# Patient Record
Sex: MALE | Race: BLACK OR AFRICAN AMERICAN
[De-identification: names, ages, dates, MRNs, and addresses within clinical notes are randomized per-mention and may not be internally consistent; named-entity substitution may affect disease eponyms.]

---

## 2020-03-14 ENCOUNTER — HOSPITAL ENCOUNTER (EMERGENCY)
Dept: HOSPITAL 97 - ER | Age: 35
LOS: 1 days | Discharge: HOME | End: 2020-03-15
Payer: SELF-PAY

## 2020-03-14 DIAGNOSIS — T22.111A: ICD-10-CM
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
## 2020-03-14 DIAGNOSIS — F17.210: ICD-10-CM

## 2020-03-14 DIAGNOSIS — R07.9: Primary | ICD-10-CM

## 2020-03-14 PROCEDURE — 99283 EMERGENCY DEPT VISIT LOW MDM: CPT

## 2020-03-14 PROCEDURE — 71046 X-RAY EXAM CHEST 2 VIEWS: CPT

## 2020-03-15 VITALS — TEMPERATURE: 98 F

## 2020-03-15 VITALS — SYSTOLIC BLOOD PRESSURE: 160 MMHG | OXYGEN SATURATION: 99 % | DIASTOLIC BLOOD PRESSURE: 85 MMHG
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
## 2024-12-17 ENCOUNTER — HOSPITAL ENCOUNTER (INPATIENT)
Dept: HOSPITAL 97 - ER | Age: 39
LOS: 11 days | Discharge: HOME | DRG: 871 | End: 2024-12-28
Attending: HOSPITALIST | Admitting: STUDENT IN AN ORGANIZED HEALTH CARE EDUCATION/TRAINING PROGRAM
Payer: COMMERCIAL

## 2024-12-17 VITALS — BODY MASS INDEX: 38.3 KG/M2

## 2024-12-17 DIAGNOSIS — M62.82: ICD-10-CM

## 2024-12-17 DIAGNOSIS — Z78.1: ICD-10-CM

## 2024-12-17 DIAGNOSIS — N18.9: ICD-10-CM

## 2024-12-17 DIAGNOSIS — J10.08: ICD-10-CM

## 2024-12-17 DIAGNOSIS — R31.29: ICD-10-CM

## 2024-12-17 DIAGNOSIS — E83.39: ICD-10-CM

## 2024-12-17 DIAGNOSIS — Z91.148: ICD-10-CM

## 2024-12-17 DIAGNOSIS — K76.0: ICD-10-CM

## 2024-12-17 DIAGNOSIS — E87.1: ICD-10-CM

## 2024-12-17 DIAGNOSIS — I50.9: ICD-10-CM

## 2024-12-17 DIAGNOSIS — E11.22: ICD-10-CM

## 2024-12-17 DIAGNOSIS — J15.9: ICD-10-CM

## 2024-12-17 DIAGNOSIS — R65.20: ICD-10-CM

## 2024-12-17 DIAGNOSIS — G92.8: ICD-10-CM

## 2024-12-17 DIAGNOSIS — Z74.01: ICD-10-CM

## 2024-12-17 DIAGNOSIS — I13.0: ICD-10-CM

## 2024-12-17 DIAGNOSIS — R74.01: ICD-10-CM

## 2024-12-17 DIAGNOSIS — Z79.899: ICD-10-CM

## 2024-12-17 DIAGNOSIS — D63.1: ICD-10-CM

## 2024-12-17 DIAGNOSIS — E87.5: ICD-10-CM

## 2024-12-17 DIAGNOSIS — N17.9: ICD-10-CM

## 2024-12-17 DIAGNOSIS — A41.89: Primary | ICD-10-CM

## 2024-12-17 DIAGNOSIS — R79.89: ICD-10-CM

## 2024-12-17 LAB
ALBUMIN SERPL BCP-MCNC: 3.6 G/DL (ref 3.4–5)
ALBUMIN/GLOB SERPL: 0.8 {RATIO} (ref 1.1–1.8)
ALP SERPL-CCNC: 78 U/L (ref 45–117)
ALT SERPL W P-5'-P-CCNC: 152 U/L (ref 16–61)
ANION GAP SERPL CALC-SCNC: 10.9 MEQ/L (ref 5–15)
APTT BLD: 33.1 SECONDS (ref 24.3–36.9)
AST SERPL W P-5'-P-CCNC: 386 U/L (ref 15–37)
BUN BLD-MCNC: 11 MG/DL (ref 7–18)
COLOR SPUN FLD: (no result)
GLOBULIN SER CALC-MCNC: 4.6 G/DL (ref 2.3–3.5)
GLUCOSE SERPLBLD-MCNC: 179 MG/DL (ref 74–106)
HCT VFR BLD CALC: 42.7 % (ref 39.6–49)
HGB BLD-MCNC: 13.8 G/DL (ref 13.6–17.9)
INR BLD: 1.39
LYMPHOCYTES # SPEC AUTO: 3.3 K/UL (ref 0.7–4.9)
MCH RBC QN AUTO: 28.7 PG (ref 27–35)
MCHC RBC AUTO-ENTMCNC: 32.3 G/DL (ref 32–36)
MCV RBC: 88.8 FL (ref 80–100)
NRBC # BLD: 0 10*3/UL (ref 0–0)
NRBC BLD AUTO-RTO: 0.1 % (ref 0–0)
PMV BLD: 11.9 FL (ref 7.6–11.3)
POTASSIUM SERPL-SCNC: 3.9 MEQ/L (ref 3.5–5.1)
PROTHROMBIN TIME: 15.4 SECONDS (ref 9.4–12.5)
RBC # BLD: 4.81 M/UL (ref 4.33–5.43)
SARS-COV+SARS-COV-2 AG RESP QL IA.RAPID: (no result)
SQUAMOUS URNS QL MICRO: <5 /HPF
TOTAL CELLS COUNTED FLD: 100
UA COMPLETE W REFLEX CULTURE PNL UR: (no result)
UA DIPSTICK W REFLEX MICRO PNL UR: (no result)
WBC # BLD AUTO: 10.1 THOU/UL (ref 4.3–10.9)

## 2024-12-17 PROCEDURE — 96365 THER/PROPH/DIAG IV INF INIT: CPT

## 2024-12-17 PROCEDURE — 80048 BASIC METABOLIC PNL TOTAL CA: CPT

## 2024-12-17 PROCEDURE — 71045 X-RAY EXAM CHEST 1 VIEW: CPT

## 2024-12-17 PROCEDURE — 87070 CULTURE OTHR SPECIMN AEROBIC: CPT

## 2024-12-17 PROCEDURE — 84145 PROCALCITONIN (PCT): CPT

## 2024-12-17 PROCEDURE — 82306 VITAMIN D 25 HYDROXY: CPT

## 2024-12-17 PROCEDURE — 84156 ASSAY OF PROTEIN URINE: CPT

## 2024-12-17 PROCEDURE — 83605 ASSAY OF LACTIC ACID: CPT

## 2024-12-17 PROCEDURE — 80074 ACUTE HEPATITIS PANEL: CPT

## 2024-12-17 PROCEDURE — 84439 ASSAY OF FREE THYROXINE: CPT

## 2024-12-17 PROCEDURE — 84132 ASSAY OF SERUM POTASSIUM: CPT

## 2024-12-17 PROCEDURE — 85610 PROTHROMBIN TIME: CPT

## 2024-12-17 PROCEDURE — 82043 UR ALBUMIN QUANTITATIVE: CPT

## 2024-12-17 PROCEDURE — 36569 INSJ PICC 5 YR+ W/O IMAGING: CPT

## 2024-12-17 PROCEDURE — 82947 ASSAY GLUCOSE BLOOD QUANT: CPT

## 2024-12-17 PROCEDURE — 82077 ASSAY SPEC XCP UR&BREATH IA: CPT

## 2024-12-17 PROCEDURE — 87040 BLOOD CULTURE FOR BACTERIA: CPT

## 2024-12-17 PROCEDURE — 94760 N-INVAS EAR/PLS OXIMETRY 1: CPT

## 2024-12-17 PROCEDURE — 83735 ASSAY OF MAGNESIUM: CPT

## 2024-12-17 PROCEDURE — 80202 ASSAY OF VANCOMYCIN: CPT

## 2024-12-17 PROCEDURE — 80053 COMPREHEN METABOLIC PANEL: CPT

## 2024-12-17 PROCEDURE — 84100 ASSAY OF PHOSPHORUS: CPT

## 2024-12-17 PROCEDURE — 97161 PT EVAL LOW COMPLEX 20 MIN: CPT

## 2024-12-17 PROCEDURE — 96368 THER/DIAG CONCURRENT INF: CPT

## 2024-12-17 PROCEDURE — 87389 HIV-1 AG W/HIV-1&-2 AB AG IA: CPT

## 2024-12-17 PROCEDURE — 84443 ASSAY THYROID STIM HORMONE: CPT

## 2024-12-17 PROCEDURE — 74176 CT ABD & PELVIS W/O CONTRAST: CPT

## 2024-12-17 PROCEDURE — 96375 TX/PRO/DX INJ NEW DRUG ADDON: CPT

## 2024-12-17 PROCEDURE — 93005 ELECTROCARDIOGRAM TRACING: CPT

## 2024-12-17 PROCEDURE — 009U3ZX DRAINAGE OF SPINAL CANAL, PERCUTANEOUS APPROACH, DIAGNOSTIC: ICD-10-PCS

## 2024-12-17 PROCEDURE — 83930 ASSAY OF BLOOD OSMOLALITY: CPT

## 2024-12-17 PROCEDURE — 99292 CRITICAL CARE ADDL 30 MIN: CPT

## 2024-12-17 PROCEDURE — 84550 ASSAY OF BLOOD/URIC ACID: CPT

## 2024-12-17 PROCEDURE — 82085 ASSAY OF ALDOLASE: CPT

## 2024-12-17 PROCEDURE — 89050 BODY FLUID CELL COUNT: CPT

## 2024-12-17 PROCEDURE — 36415 COLL VENOUS BLD VENIPUNCTURE: CPT

## 2024-12-17 PROCEDURE — 87811 SARS-COV-2 COVID19 W/OPTIC: CPT

## 2024-12-17 PROCEDURE — 81001 URINALYSIS AUTO W/SCOPE: CPT

## 2024-12-17 PROCEDURE — 83874 ASSAY OF MYOGLOBIN: CPT

## 2024-12-17 PROCEDURE — 51702 INSERT TEMP BLADDER CATH: CPT

## 2024-12-17 PROCEDURE — 96361 HYDRATE IV INFUSION ADD-ON: CPT

## 2024-12-17 PROCEDURE — 85730 THROMBOPLASTIN TIME PARTIAL: CPT

## 2024-12-17 PROCEDURE — 99291 CRITICAL CARE FIRST HOUR: CPT

## 2024-12-17 PROCEDURE — 0T9B70Z DRAINAGE OF BLADDER WITH DRAINAGE DEVICE, VIA NATURAL OR ARTIFICIAL OPENING: ICD-10-PCS

## 2024-12-17 PROCEDURE — 62270 DX LMBR SPI PNXR: CPT

## 2024-12-17 PROCEDURE — 82550 ASSAY OF CK (CPK): CPT

## 2024-12-17 PROCEDURE — 97116 GAIT TRAINING THERAPY: CPT

## 2024-12-17 PROCEDURE — 86140 C-REACTIVE PROTEIN: CPT

## 2024-12-17 PROCEDURE — 85025 COMPLETE CBC W/AUTO DIFF WBC: CPT

## 2024-12-17 PROCEDURE — 82570 ASSAY OF URINE CREATININE: CPT

## 2024-12-17 PROCEDURE — 87804 INFLUENZA ASSAY W/OPTIC: CPT

## 2024-12-17 PROCEDURE — 97530 THERAPEUTIC ACTIVITIES: CPT

## 2024-12-17 PROCEDURE — 70450 CT HEAD/BRAIN W/O DYE: CPT

## 2024-12-17 PROCEDURE — 80069 RENAL FUNCTION PANEL: CPT

## 2024-12-17 PROCEDURE — 86038 ANTINUCLEAR ANTIBODIES: CPT

## 2024-12-17 PROCEDURE — 83935 ASSAY OF URINE OSMOLALITY: CPT

## 2024-12-17 PROCEDURE — 83615 LACTATE (LD) (LDH) ENZYME: CPT

## 2024-12-17 PROCEDURE — 94640 AIRWAY INHALATION TREATMENT: CPT

## 2024-12-17 RX ADMIN — OSELTAMIVIR PHOSPHATE SCH: 75 CAPSULE ORAL at 21:00

## 2024-12-17 RX ADMIN — ACETAMINOPHEN PRN MG: 650 SUPPOSITORY RECTAL at 23:44

## 2024-12-17 RX ADMIN — SODIUM CHLORIDE SCH MLS: 0.9 INJECTION, SOLUTION INTRAVENOUS at 20:37

## 2024-12-17 RX ADMIN — METRONIDAZOLE SCH MLS: 500 INJECTION, SOLUTION INTRAVENOUS at 20:59

## 2024-12-17 RX ADMIN — SODIUM CHLORIDE SCH MLS: 9 INJECTION, SOLUTION INTRAVENOUS at 21:10

## 2024-12-17 RX ADMIN — SODIUM CHLORIDE ONE MLS: 9 INJECTION, SOLUTION INTRAVENOUS at 21:43

## 2024-12-17 RX ADMIN — HEPARIN SODIUM SCH UNIT: 5000 INJECTION, SOLUTION INTRAVENOUS; SUBCUTANEOUS at 20:59

## 2024-12-17 RX ADMIN — HUMAN INSULIN SCH UNIT: 100 INJECTION, SOLUTION SUBCUTANEOUS at 20:58

## 2024-12-17 RX ADMIN — SODIUM CHLORIDE SCH: 9 INJECTION, SOLUTION INTRAVENOUS at 17:22

## 2024-12-17 NOTE — RAD REPORT
EXAM: CT brain without contrast



HISTORY: AMS



COMPARISON: 2/5/2016



TECHNIQUE: Multiple contiguous axial images were obtained and a CT of the brain without contrast. Sag
ittal and coronal reformats were performed.

 One or more of the following dose reduction techniques were used: Automated exposure control, adjust
ment of the mA and/or kV according to patient size, and/or iterative reconstruction. 



FINDINGS:

 

No evidence of hydrocephalus, intracranial hemorrhage, or extra-axial fluid collection.

 The brain is normal in morphology. No evidence of midline shift or areas of brain edema.



The calvarium is intact. The visualized paranasal sinuses and mastoid air cells are essentially clear
.



IMPRESSION: 



No evidence of acute intracranial abnormality. 







Reported By: Raman Morton 

Electronically Signed:  12/17/2024 11:49 AM

## 2024-12-17 NOTE — P.HP
Certification for Inpatient


Patient admitted to: Inpatient


With expected LOS: >2 Midnights


Patient will require the following post-hospital care: None


Practitioner: I am a practitioner with admitting privileges, knowledge of 

patient current condition, hospital course, and medical plan of care.


Services: Services provided to patient in accordance with Admission requirements

found in Title 42 Section 412.3 of the Code of Federal Regulations





Patient History


Date of Service: 12/18/24


Reason for admission: AMS, pneumonia


History of Present Illness: 





39-year-old male with history of diabetes, hypertension, CHF presents emergency 

department chief complaint of altered mental status.  His wife reports that over

the course of the last 24 to 36 hours he had been having large amounts of 

diarrhea, vomiting and been becoming confused.  He was incontinent of urine and 

stool at home and had been becoming progressively more altered throughout the 

day.  For that reason he is brought to the emergency department.  





When he arrived to the emergency department he was lethargic, confused, oriented

x 1-2.  His labs were significant for a white blood cell count of 10.1 platelets

113 sodium 135 creatinine 1.75 GFR 50 glucose 179 lactic acid 3.4  ALT 

152 T. bili 0.8.  CT of the abdomen pelvis was performed which revealed no acute

or concerning abnormalities in the abdomen or pelvis, does show fatty liver.  CT

of the head was negative for acute findings, chest x-ray showed mild 

interstitial prominence greatest in the left upper lobe likely related to 

infection.





Given patient's significant altered mental status and lack of clear explanation 

lumbar puncture was also performed, spinal fluid was clear and is pending 

interpretation.


Allergies





No Known Allergies Allergy (Unverified 12/17/24 21:55)


   





Home Medications: 








NK [No Home Meds]  12/17/24 








- Past Medical/Surgical History


-: CHF


-: Diabetes


-: Hypertension


Psychosocial/ Personal History: Patient lives at home with his wife, works at 

[employer]





- Family History


Family History: Reviewed- Non-Contributory





- Social History


Alcohol use: No


CD- Drugs: No


Caffeine use: Yes


Place of Residence: Home





Review of Systems


10-point ROS is otherwise unremarkable


Gastrointestinal: Nausea, Vomiting


Neurological: Confusion





Physical Examination





- Physical Exam


General: Confused (Drowsy)


HEENT: Atraumatic, PERRLA, Mucous membr. moist/pink, EOMI, Sclerae nonicteric


Neck: Supple, 2+ carotid pulse no bruit, No LAD, Without JVD or thyroid 

abnormality


Respiratory: Clear to auscultation bilaterally, Normal air movement


Cardiovascular: Regular rate/rhythm, Normal S1 S2


Gastrointestinal: Normal bowel sounds, No tenderness


Musculoskeletal: No tenderness


Integumentary: No rashes


Neurological: Normal gait, Normal speech, Normal strength at 5/5 x4 extr, Normal

tone, Normal affect


Lymphatics: No axilla or inguinal lymphadenopathy





- Studies


Laboratory Data (last 24 hrs)











  12/17/24 12/17/24





  10:16 10:16


 


WBC   10.10


 


Hgb   13.8


 


Hct   42.7


 


Plt Count   113 L


 


Sodium  135 L 


 


Potassium  3.9 


 


BUN  11 


 


Creatinine  1.75 H 


 


Glucose  179 H 


 


Total Bilirubin  0.8 


 


AST  386 H 


 


ALT  152 H 


 


Alkaline Phosphatase  78 











Assessment and Plan





- Plan


Assessment:


Severe sepsis secondary to influenza A/viral


Metabolic encephalopathy secondary to above


Acute kidney injury


Transaminitis


History of diabetes mellitus type 2noncompliant


History of CHFunknown EF


History of hypertension








Plan:


Severe sepsis secondary to influenza A/viral


Metabolic encephalopathy secondary to above


Positive for influenza A


Patient with profuse watery diarrhea


Confused/agitated


Will order Tamiflu but likely unable to take by mouth for now


Continue broad-spectrum antibiotics given concern for pneumonia, profuse 

diarrhea


Reevaluate antibiotics after cultures return


Pulmonary consult








Acute kidney injury


Continue IV fluids


Nephrology consult





Transaminitis


CT abdomen pelvis performed negative for acute findings, trend CMP





History of diabetes mellitus type 2noncompliant


History of CHFunknown EF


History of hypertension


Patient noncompliant with medication for at least the last few months per wife





DVT PPX: Heparin subcu 


Code status:full


Discharge Plan: Home


Plan to discharge in: Greater than 2 days





- Advance Directives


Does patient have a Living Will: No


Does patient have a Durable POA for Healthcare: No





- Code Status/Comfort Care


Code Status Assessed: Yes (Full code)


Critical Care: No


Time Spent Managing Pts Care (In Minutes): 62

## 2024-12-17 NOTE — EDPHYS
Physician Documentation                                                                           

 [facility]                                                                 

Name: Aniket May Jr                                                                              

Age: 39 yrs                                                                                       

Sex: Male                                                                                         

: 1985                                                                                   

MRN: [id number]                                                                                   

Arrival Date: 2024                                                                          

Time: 09:59                                                                                       

Account#: [id number]                                                                            

Bed 3                                                                                             

Private MD:                                                                                       

ED Physician Gilbert Chavez                                                                         

HPI:                                                                                              

                                                                                             

11:30 This 39 yrs old Black Male presents to ER via EMS with complaints of Altered Mental     rn  

      Status.                                                                                     

11:30 The patient presents with decreased responsiveness. Onset: The symptoms/episode         rn  

      began/occurred 2 day(s) ago. Possible causes: unknown. Current symptoms: In the             

      emergency department the patient's symptoms are unchanged from the initial                  

      presentation. It is unknown whether or not the patient has had similar symptoms in the      

      past. EMS reports called out by family member for altered mental status and decreased       

      responsiveness. Family member reported flulike symptoms and nausea and vomiting with        

      diarrhea for the last 24 hours with decreased responsiveness today. No seizure              

      activity. Normal glucose per EMS..                                                          

                                                                                                  

Historical:                                                                                       

- Allergies:                                                                                      

11:01 oranges;                                                                                kc6 

- Home Meds:                                                                                      

10:04 Unable to obtain [Active];                                                              kc6 

- PMHx:                                                                                           

10:04 Hypertensive disorder;                                                                  kc6 

11:01 Diabetes mellitus; Congestive heart failure;                                            kc6 

- PSHx:                                                                                           

10:04 Unable to Obtain;                                                                       kc6 

                                                                                                  

- Immunization history:: Adult Immunizations unknown.                                             

- Infectious Disease History:: Denies.                                                            

- Social history:: Smoking status: unknown.                                                       

- Unable to obtain history due to: altered mental status.                                         

                                                                                                  

                                                                                                  

ROS:                                                                                              

11:30 Unable to obtain ROS due to altered mental status,                                      rn  

                                                                                                  

Exam:                                                                                             

11:30 Constitutional:  This is a well developed, well nourished patient who is awake,         rn  

      noncooperative Head/Face:  Normocephalic, atraumatic. ENT:  Dry mucous membranes            

      Cardiovascular:  Tachycardic, regular. Respiratory:  Mild tachypnea Abdomen/GI:  Soft,      

      mid abdominal tenderness without distention or discoloration MS/ Extremity:  Pulses         

      equal, no cyanosis.  Neurovascular intact.  Full, normal range of motion.  Equal            

      circumference. Neuro:  Awake, opens eyes, answers some questions, seems selective,          

      moves all 4 extremities with equal strength                                                 

12:35 ECG was reviewed by the Attending Physician.                                            rn  

                                                                                                  

Vital Signs:                                                                                      

10:02  / 62; Pulse 115; Resp 18 S; Temp 100.1(O); Pulse Ox 96% on R/A; Weight 124.28 kg kc6 

      (M);                                                                                        

10:20 BP 90 / 58;                                                                             kc6 

11:00  / 65; Pulse 106; Resp 36 S; Pulse Ox 98% on R/A;                                 kc6 

11:47  / 73; Pulse 109; Resp 32; Temp 100.6(TE); Pulse Ox 93% on R/A;                   kc6 

12:05 BP 97 / 74; Pulse 100; Resp 21 S; Pulse Ox 91% on R/A;                                  kc6 

13:54  / 88; Pulse 110; Resp 30 S; Pulse Ox 96% on 3 lpm NC;                            kc6 

14:08  / 93; Pulse 112; Resp 28 S; Temp 100.5(TE); Pulse Ox 95% on 3 lpm NC;            kc6 

15:08  / 88; Pulse 110; Resp 30 S; Pulse Ox 96% on 3 lpm NC;                            kc6 

17:06  / 90; Pulse 109; Resp 25 S; Temp 102.8(R); Pulse Ox 94% on 3 lpm NC;             kc6 

17:34  / 82; Pulse 110; Resp 27 S; Pulse Ox 100% on 3 lpm NC;                           kc6 

19:43  / 76; Pulse 108; Resp 21; Temp 101.2; Pulse Ox 99% on 3 lpm NC;                  ha1 

19:50  / 51; Pulse 109; Resp 23; Pulse Ox 98% on 3 lpm NC;                              ay  

                                                                                                  

Procedures:                                                                                       

16:48 Lumbar Puncture: Patient placed in left lateral decubitus position. Prepped with        rn  

      Betadine. Draped using sterile technique. Collected 5 ml's of clear fluid. Sample sent      

      to lab. Patient tolerated well. Lumbar Puncture: Opening pressure was low, fluid check      

      10 to 15 minutes to collect.. Procedural sedation: Pre-procedure assessment: ASA            

      physical classification: I - healthy, no underlying organic disease, Airway assessment:     

      able to maintain airway, can open mouth without difficulty, Monitoring during               

      procedure: cardiac monitor, continuous pulse oximetry, nurse at bedside at all times,       

      Medications employed: Ativan, 0.5 mg(s), Ketamine, 100 mg(s), Post-procedure                

      assessment: Respiratory status: even and unlabored, a reversal agent was not used.          

                                                                                                  

MDM:                                                                                              

10:00 Medical Screening Exam initiated                                                        rn  

13:54 Differential Diagnosis: pneumonia, volume depletion, Colitis, enteritis. Data reviewed: rn  

      vital signs, nurses notes, lab test result(s), radiologic studies, CT scan, plain           

      films, and as a result, I will admit patient. Consideration of Admission/Observation        

      Patient was admitted/placed on observation. Escalation of care including                    

      admission/observation considered. Counseling: I had a detailed discussion with the          

      patient and/or guardian regarding the historical points, exam findings, and any             

      diagnostic results supporting the discharge/admit diagnosis, lab results, radiology         

      results, the need for further work-up and treatment in the hospital. Response to            

      treatment: the patient's symptoms have markedly improved after treatment, and as a          

      result, I will admit patient.                                                               

13:55 ED course: More alert, patient speaks to family in room, elevated lactate, possible     rn  

      pneumonia. .                                                                                

16:50 Management of patient was discussed with the following: Hospitalist: Discussed case     rn  

      with hospitalist, they request lumbar puncture to rule out meningitis or                    

      encephalopathy. ED course: I personally spent 35 minutes engaged in work directly           

      related to the individual patient's care. This does not include any time spent              

      performing procedures. The patient has been deemed critically ill because of altered        

      mental status, encephalopathy requiring sepsis workup, lumbar puncture, multiple visits     

      with family, review of previous charts and organization of admission to the hospital..      

19:07 ED course: Patient improved, sepsis secondary to influenza/viral source. Sepsis         rn  

      reevaluation completed..                                                                    

                                                                                                  

                                                                                             

10:02 Order name: Blood Culture Adult (2)                                                     rn  

                                                                                             

10:02 Order name: CBC with Diff; Complete Time: 16:32                                         rn  

                                                                                             

10:02 Order name: CMP; Complete Time: 11:12                                                   rn  

                                                                                             

10:02 Order name: Lactate w/ 2H reflex if indic.; Complete Time: 11:12                        rn  

                                                                                             

10:02 Order name: Protime (+inr)                                                              rn  

                                                                                             

10:02 Order name: Ptt, Activated                                                              rn  

                                                                                             

10:02 Order name: Urinalysis w/ reflexes; Complete Time: 11:55                                rn  

                                                                                             

10:56 Order name: Ghost Lactate-NO COLLECT Timer; Complete Time: 13:02                        EDMS

                                                                                             

11:12 Order name: ETOH Level; Complete Time: 13:02                                            rn  

                                                                                             

16:35 Order name: Flu; Complete Time: 17:32                                                   [facility] 

                                                                                             

16:35 Order name: SARS RAPID; Complete Time: 17:32                                            [facility] 

                                                                                             

16:38 Order name: Stool Culture                                                               [facility] 

                                                                                             

16:38 Order name: C.difficile                                                                 [facility] 

                                                                                             

16:48 Order name: Fluid Cell Count,Body                                                       rn  

                                                                                             

16:48 Order name: Body Fluid Culture                                                          rn  

                                                                                             

18:12 Order name: Body Fluid Cell Count; Complete Time: 19:08                                 EDMS

                                                                                             

10:02 Order name: Chest Single View XRAY; Complete Time: 11:12                                rn  

                                                                                             

10:02 Order name: CT Head Brain wo Cont; Complete Time: 11:55                                 rn  

                                                                                             

11:37 Order name: Abdomen ; Complete Time: 11:55                                              EDMS

                                                                                             

10:02 Order name: Accucheck; Complete Time: 10:20                                             rn  

                                                                                             

10:02 Order name: Cardiac monitoring; Complete Time: 10:20                                    rn  

                                                                                             

10:02 Order name: EKG - Nurse/Tech; Complete Time: 10:20                                      rn  

                                                                                             

10:02 Order name: IV Saline Lock - Large Bore; Complete Time: 10:20                           rn  

                                                                                             

10:02 Order name: Labs collected and sent; Complete Time: 10:20                               rn  

                                                                                             

10:02 Order name: O2 Per Protocol; Complete Time: 10:04                                       rn  

                                                                                             

10:02 Order name: O2 Sat Monitoring; Complete Time: 10:04                                     rn  

                                                                                             

10:02 Order name: Vital Signs; Complete Time: 10:04                                           rn  

                                                                                             

10:35 Order name: Labs - recollect needed: recollect green top; Complete Time: 11:47          bd  

                                                                                             

14:54 Order name: Restraint:Non-Violent; Complete Time: 14:57                                 rn  

                                                                                             

16:32 Order name: Labs - recollect needed: recollect lavender top; Complete Time: 17:04       bd  

                                                                                                  

EC:35 Rate is 111 beats/min. Rhythm is regular. QRS Axis is Normal. AK interval is normal.    rn  

      QRS interval is normal. QT interval is normal. No Q waves. T waves are Normal. No ST        

      changes noted. Clinical impression: Sinus tachycardia. Interpreted by me. Reviewed by       

      me.                                                                                         

                                                                                                  

Administered Medications:                                                                         

10:02 CANCELLED (Duplicate Order): ns 0.9% (30 ml/kg) 30 ml/kg IV at bolus once; Sepsis       rn  

      Protocol; to be given as a bolus over 90 minutes                                            

11:00 Drug: NS 0.9% IV 1000 ml IV at 1000 ml once; to be given as a bolus over 60 minutes     kc6 

      Route: IV; Rate: 1000 ml; Site: right antecubital;                                          

12:00 Follow up: Response: No adverse reaction; IV Status: Completed infusion; IV Intake:     kc6 

      1000ml                                                                                      

11:00 Drug: NS 0.9% IV 1000 ml IV at 1000 ml once; to be given as a bolus over 60 minutes     kc6 

      Route: IV; Rate: 1000 ml; Site: right antecubital;                                          

12:00 Follow up: Response: No adverse reaction; IV Status: Completed infusion; IV Intake:     kc6 

      1000ml                                                                                      

11:29 Drug: Ativan IVP 0.5 mg IVP once Route: IVP; Site: right antecubital;                   kc6 

11:47 Follow up: Response: No adverse reaction; Anxiety decreased; RASS: Drowsy (-1)          kc6 

12:17 Drug: Rocephin IV 1 grams IV at calculated rate once; Given slow IV push per pharmacy   kc6 

      instructions Route: IV; Rate: calculated rate; Site: right antecubital;                     

12:45 Follow up: Response: No adverse reaction; IV Status: Completed infusion; IV Intake: [id number] 

12:17 Drug: Zithromax IVPB 500 mg IVPB once over 1 hrs; mix in 250 mL NS Route: IVPB; Infused kc6 

      Over: 1 hrs; Site: right antecubital;                                                       

13:17 Follow up: Response: No adverse reaction; IV Status: Completed infusion; IV Intake:     kc6 

      250ml                                                                                       

13:51 Drug: Ativan IVP 0.5 mg IVP once Route: IVP; Site: right antecubital;                   ko1 

14:12 Follow up: Response: No adverse reaction; Anxiety decreased; RASS: Drowsy (-1)          kc6 

16:04 Drug: Ativan IVP 0.5 mg IVP once Route: IVP; Site: right antecubital;                   kc6 

16:30 Follow up: Response: No adverse reaction; Anxiety decreased; RASS: Drowsy (-1)          kc6 

16:04 Drug: Ketamine IVP 0.2 mg/kg IVP once; Mix in 50 mL NS IV over 10 minutes. Maximum Dose kc6 

      10 mg {Note: a total of 75mg IV was administered per Dr. Chavez in the [id number].} Route:       

      IVP; Site: right antecubital;                                                               

16:30 Follow up: Response: No adverse reaction; Anxiety decreased; RASS: Moderate sedation    kc6 

      (-3)                                                                                        

17:07 Drug: Acetaminophen AK Suppository 650 mg AK once Route: AK;                            kc6 

17:30 Follow up: Response: No adverse reaction                                                kc6 

19:40 Drug: Acetaminophen AK Suppository 325 mg AK once Route: AK;                            ha1 

19:55 Follow up: Response: No adverse reaction                                                ay  

                                                                                                  

                                                                                                  

Disposition Summary:                                                                              

24 13:55                                                                                    

Hospitalization Ordered                                                                           

 Notes:       Hospitalization Status: Inpatient Admission                                           
  rn

      Provider: Adarsh Chavez                                                                rn  

      Condition: Stable                                                                       rn  

      Problem: new                                                                            rn  

      Symptoms: have improved                                                                 rn  

      Bed/Room Type: Standard                                                                 rn  

      Location: Intensive Care Unit(24 16:47)                                           bd  

      Room Assignment: 1-(24 16:47)                                                     bd  

      Diagnosis                                                                                   

        - Altered mental status, unspecified                                                  rn  

        - Diarrhea, unspecified                                                               rn  

        - Pneumonia, unspecified organism                                                     rn  

      Forms:                                                                                      

        - Medication Reconciliation Form                                                      rn  

        - SBAR form                                                                           rn  

        - Leadership Thank You Letter                                                         rn  

Signatures:                                                                                       

Dispatcher [employer]                           EDMS                                                 

Krista Chacon                              bd                                                   

Gilbert Chavez MD MD rn Attema, Lee, FNP-C                      FNP-Cla1                                                  

Lucila Diaz, RN                        RN   ha1                                                  

Dayami Aleman RN                   RN   vivi6                                                  

Raysa Croft, RN                       RN   ko1                                                  

Mylene Mccoy RN   ay                                                   

                                                                                                  

Corrections: (The following items were deleted from the chart)                                    

10:02 10:02 NS 0.9% IV (30 ml/kg) 30 ml/kg IV at bolus once; Sepsis Protocol; to be given as  rn  

      a bolus over 90 minutes ordered. rn                                                         

10:02 10:02 BLOOD CULTURE*+BA.LAB.BRZ ordered. EDMS                                           EDMS

10:02 10:02 CBC+H.LAB.BRZ ordered. EDMS                                                       EDMS

10:02 10:02 COMPREHENSIVE METABOLIC PANEL+C.LAB.BRZ ordered. EDMS                             EDMS

10:02 10:02 LACTATE+C.LAB.BRZ ordered. EDMS                                                   EDMS

10:02 10:02 PROTIME (+INR)+COAG.LAB.BRZ ordered. EDMS                                         EDMS

10:02 10:02 PTT, ACTIVATED+COAG.LAB.BRZ ordered. EDMS                                         EDMS

10:02 10:02 Urinalysis+U.LAB.BRZ ordered. EDMS                                                EDMS

10:02 10:02 Chest Single View+RAD.RAD.BRZ ordered. EDMS                                       EDMS

10:02 10:02 Head Brain Wo Cont+CT.RAD.BRZ ordered. EDMS                                       EDMS

10:02 10:02 Abdomen Pelvis W Con+CT.RAD.BRZ ordered. EDMS                                     EDMS

11:02 10:04 Allergies: Unable to obtain; kc6                                                  kc6 

11:13 11:13 ETHANOL+C.LAB.BRZ ordered. EDMS                                                   EDMS

16:35 16:35 Influenza Screen (A \T\ B)+BA.LAB.BRZ ordered. EDMS                                 EDMS

16:35 16:35 SARS-COV-2 Antigen Rapid+I.LAB.BRZ ordered. EDMS                                  EDMS

16:38 16:38 Stool Culture+BA.LAB.BRZ ordered. EDMS                                            EDMS

16:38 16:38 C.difficile GDH Ag \T\ Toxin AB+LAB.BRZ ordered. EDMS                               EDMS

16:47 13:55 Telemetry/MedSurg (Inpatient) rn                                                  bd  

16:47 13:55 rn                                                                                bd  

                                                                                                  

**************************************************************************************************

## 2024-12-17 NOTE — RAD REPORT
EXAMINATION: ONE VIEW CHEST XR



CLINICAL INDICATION: AMS



TECHNIQUE: Frontal chest projection is submitted. Examination is limited by patient positioning and t
echnique.



COMPARISON: 3/15/2020



FINDINGS:



Mild interstitial prominence is seen, greatest in the left upper lobe. No focal consolidation develop
ing bacterial pneumonia is seen. The heart is upper limit of normal in size. No displaced fractures

identified.



IMPRESSION: 



Mild interstitial prominence greatest in left upper lobe, likely related to infection. 







Reported By: Raman Morton 

Electronically Signed:  12/17/2024 10:43 AM

## 2024-12-17 NOTE — RAD REPORT
EXAMINATION: CT ABDOMEN AND PELVIS WITHOUT CONTRAST



CLINICAL INDICATION: vomiting/diarrhea/AMS/hypotension



TECHNIQUE: CT abdomen and pelvis was performed, without IV contrast, as per department protocol. Axia
l, sagittal and coronal reconstructions were obtained. One or more of the following dose reduction

techniques were used: Automated exposure control, adjustment of the mA and kV according to the patien
t size, and iterative reconstruction. Unless otherwise specified, incidental findings do not

require dedicated imaging follow-up. 



COMPARISON: No prior exam.



FINDINGS:  



The lack of intravenous contrast limits the sensitivity of this exam for evaluation of solid visceral
 organs, vascular structures, and retroperitoneum.



LOWER CHEST: The visualized lung bases are clear.



LIVER:Mild fatty liver is present.  No focal lesion or biliary dilatation is seen.  Grossly unremarka
ble gallbladder.



SPLEEN: Normal size.  No focal lesion.



PANCREAS: No mass, ductal dilation, or rick-pancreatic fluid.





ADRENALS: Normal; no mass.



KIDNEYS AND URETERS: Normal size and contour. No hydronephrosis.



URINARY BLADDER: Normal contour.



GASTROINTESTINAL TRACT: No evidence of bowel obstruction, significant free fluid, free air or abscess
.



APPENDIX: Normal appendix. 



LYMPH NODES: No lymphadenopathy.



MUSCULOSKELETAL: No acute or suspicious osseous abnormality.





ADDITIONAL FINDINGS: None.



IMPRESSION: 



No acute or concerning abnormalities in the abdomen or pelvis, with evaluation limited by lack of IV 
contrast.



Fatty liver.



Reported By: Raman Morton 

Electronically Signed:  12/17/2024 11:50 AM

## 2024-12-17 NOTE — ER
Nurse's Notes                                                                                     

 [facility]                                                                 

Name: Aniket May Jr                                                                              

Age: 39 yrs                                                                                       

Sex: Male                                                                                         

: 1985                                                                                   

MRN: [id number]                                                                                   

Arrival Date: 2024                                                                          

Time: 09:59                                                                                       

Account#: [id number]                                                                            

Bed 3                                                                                             

Private MD:                                                                                       

Diagnosis: Altered mental status, unspecified;Diarrhea, unspecified;Pneumonia, unspecified organism

                                                                                                  

Presentation:                                                                                     

                                                                                             

10:02 Chief complaint: EMS states: they were toned out by wife for flu like symptoms for a    kc6 

      few days with n/v/d and AMS over the last 24hrs. Coronavirus screen: At this time, the      

      client does not indicate any symptoms associated with coronavirus-19. Ebola Screen: No      

      symptoms or risks identified at this time. Initial Sepsis Screen: Does the patient meet     

      any 2 criteria? Altered Mental Status. HR > 90 bpm. Does the patient have a suspected       

      source of infection? No. Patient's initial sepsis screen is negative. Risk Assessment:      

      Do you want to hurt yourself or someone else? Patient reports no desire to harm self or     

      others. Onset of symptoms was 2024.                                            

10:02 Method Of Arrival: EMS: [facility]                                                    kc6 

10:02 Acuity: NANCIE 2                                                                           kc6 

                                                                                                  

Historical:                                                                                       

- Allergies:                                                                                      

11:01 oranges;                                                                                kc6 

- Home Meds:                                                                                      

10:04 Unable to obtain [Active];                                                              kc6 

- PMHx:                                                                                           

10:04 Hypertensive disorder;                                                                  kc6 

11:01 Diabetes mellitus; Congestive heart failure;                                            kc6 

- PSHx:                                                                                           

10:04 Unable to Obtain;                                                                       kc6 

                                                                                                  

- Immunization history:: Adult Immunizations unknown.                                             

- Infectious Disease History:: Denies.                                                            

- Social history:: Smoking status: unknown.                                                       

- Unable to obtain history due to: altered mental status.                                         

                                                                                                  

                                                                                                  

Screening:                                                                                        

10:21 [facility] ED Fall Risk Assessment (Adult) History of falling in the last 3 months,       kc6 

      including since admission No falls in past 3 months (0 pts) Confusion or Disorientation     

      Yes (5 pts) Intoxicated or Sedated No (0 pts) Impaired Gait No (0 pts) Mobility Assist      

      Device Used No (0 pt) Altered Elimination No (0 pt) Score/Fall Risk Level 3 or more         

      points = High Risk Oriented to surroundings, Maintained a safe environment, Educated pt     

      \T\ family on fall prevention, incl call for assistance when getting out of bed. Abuse      

      screen: Denies threats or abuse. Denies injuries from another. Nutritional screening:       

      No deficits noted. Tuberculosis screening: No symptoms or risk factors identified.          

                                                                                                  

Assessment:                                                                                       

10:00 General: Appears distressed, uncomfortable, obese, unkempt, well developed, Behavior is kc6 

      agitated, restless. Pain: Unable to use pain scale. Patient is disoriented. Neuro:          

      Level of Consciousness is confused, Oriented to none. Cardiovascular: Capillary refill      

      < 3 seconds Rhythm is sinus tachycardia. Respiratory: Airway is patent Trachea midline      

      Respiratory effort is even, unlabored, Respiratory pattern is regular, symmetrical. GI:     

      Abdomen is round Last BM was 2024. at 11:03. Parent/caregiver reports the      

      patient having diarrhea, nausea, vomiting. : No signs and/or symptoms were reported       

      regarding the genitourinary system. EENT: No signs and/or symptoms were reported            

      regarding the EENT system. Derm: No signs and/or symptoms reported regarding the            

      dermatologic system. Skin is intact, is healthy with good turgor, Skin is pink, warm \T\    

      dry. Musculoskeletal: No signs and/or symptoms reported regarding the musculoskeletal       

      system. Circulation, motion, and sensation intact. Capillary refill < 3 seconds, Range      

      of motion: intact in all extremities.                                                       

11:00 Reassessment: No changes from previously documented assessment. Patient and/or family   kc6 

      updated on plan of care and expected duration. Pain level reassessed.                       

12:00 Reassessment: No changes from previously documented assessment. Patient and/or family   kc6 

      updated on plan of care and expected duration. Pain level reassessed.                       

12:15 Reassessment: pt is awake, A\T\O to self and place but not time and situation.            kc6

13:00 Reassessment: No changes from previously documented assessment. Patient and/or family   kc6 

      updated on plan of care and expected duration. Pain level reassessed.                       

13:30 Reassessment: pt is grabbing and kicking at staff, attempting to get out bed while      kc6 

      being cleaned of incontinence. Mona, LAKISHA at [facility], RN at bedside. MD made aware.           

13:45 Reassessment: pt appears to be be getting increasingly agitated and thrashing around in [facility] 

      the bed with wife and sister at bedside. verbal reassurance given to pt. bed alarm on       

      and pt repositioned in the bed. MD made aware.                                              

14:00 Reassessment: No changes from previously documented assessment. Patient and/or family   kc6 

      updated on plan of care and expected duration. Pain level reassessed.                       

14:15 Reassessment: Melina Gong (wife) [phone].                                         kc6 

15:00 Reassessment: No changes from previously documented assessment. Patient and/or family   kc6 

      updated on plan of care and expected duration. Pain level reassessed.                       

16:04 Reassessment: Patient appears in no apparent distress at this time. No changes from     kc6 

      previously documented assessment. Patient and/or family updated on plan of care and         

      expected duration. Pain level reassessed.                                                   

17:06 Reassessment: Patient appears in no apparent distress at this time. No changes from     kc6 

      previously documented assessment. Patient and/or family updated on plan of care and         

      expected duration. Pain level reassessed.                                                   

17:22 Reassessment: attempted to call report to ICU. per Amy Woodward RN they have to     kc 

      move a pt out before his bed is ready.                                                      

17:52 Reassessment: attempted to call report to ICU. per Song, they are still in the process kc6 

      of moving out a pt to accept this one. charlotte, RN made aware.                               

18:00 Reassessment: Kandis  states that they are having issues running Blue top   ss  

      and that they will need a recollect. Asked if phlebotomy could come attempt to obtain,      

      but phlebotomy stated that they would not be able to as they had a difficult time           

      obtaining CBC.                                                                              

18:06 Reassessment: Patient appears in no apparent distress at this time. No changes from     kc6 

      previously documented assessment. Patient and/or family updated on plan of care and         

      expected duration. Pain level reassessed.                                                   

18:30 Reassessment: nurse to nurse report given to Amy Woodward RN in the ICU.            kc6 

19:00 Reassessment: report given to LAKISHA Castro \T\ LAKISHA Gaytan. informed that pt is pending      kc6

      recollect on blue top and cdiff collection. verbalized understanding.                       

19:05 General: Appears uncomfortable, ill, obese, unkempt, Behavior is agitated, restless,    ay  

      uncooperative.                                                                              

19:05 Pain: Unable to use pain scale. Patient is disoriented. FLACC scale score is 0 out of   ay  

      10. Neuro: Level of Consciousness is confused, lethargic, Oriented to none.                 

      Cardiovascular: Capillary refill < 3 seconds Patient's skin is warm and dry. Rhythm is      

      sinus tachycardia. Respiratory: Airway is patent Trachea midline Respiratory effort is      

      even, unlabored, Respiratory pattern is regular, symmetrical. GI: Abdomen is round          

      obese. : No signs and/or symptoms were reported regarding the genitourinary system.       

      Mcmullen in place. EENT: No signs and/or symptoms were reported regarding the EENT system.     

      Derm: Skin is intact, is healthy with good turgor, Skin is normal. Musculoskeletal: No      

      signs and/or symptoms reported regarding the musculoskeletal system. Capillary refill <     

      3 seconds.                                                                                  

                                                                                                  

Vital Signs:                                                                                      

10:02  / 62; Pulse 115; Resp 18 S; Temp 100.1(O); Pulse Ox 96% on R/A; Weight 124.28 kg kc6 

      (M);                                                                                        

10:20 BP 90 / 58;                                                                             kc6 

11:00  / 65; Pulse 106; Resp 36 S; Pulse Ox 98% on R/A;                                 kc6 

11:47  / 73; Pulse 109; Resp 32; Temp 100.6(TE); Pulse Ox 93% on R/A;                   kc6 

12:05 BP 97 / 74; Pulse 100; Resp 21 S; Pulse Ox 91% on R/A;                                  kc6 

13:54  / 88; Pulse 110; Resp 30 S; Pulse Ox 96% on 3 lpm NC;                            kc6 

14:08  / 93; Pulse 112; Resp 28 S; Temp 100.5(TE); Pulse Ox 95% on 3 lpm NC;            kc6 

15:08  / 88; Pulse 110; Resp 30 S; Pulse Ox 96% on 3 lpm NC;                            kc6 

17:06  / 90; Pulse 109; Resp 25 S; Temp 102.8(R); Pulse Ox 94% on 3 lpm NC;             kc6 

17:34  / 82; Pulse 110; Resp 27 S; Pulse Ox 100% on 3 lpm NC;                           kc6 

19:43  / 76; Pulse 108; Resp 21; Temp 101.2; Pulse Ox 99% on 3 lpm NC;                  ha1 

19:50  / 51; Pulse 109; Resp 23; Pulse Ox 98% on 3 lpm NC;                              ay  

                                                                                                  

ED Course:                                                                                        

10:00 Patient arrived in ED.                                                                  rn  

10:00 Gilbert Chavez MD is Attending Physician.                                                rn  

10:04 Triage completed.                                                                       kc6 

10:04 Arm band placed on.                                                                     kc6 

10:04 Patient has correct armband on for positive identification. Placed in gown. Bed in low  kc6 

      position. Call light in reach. Side rails up X2. Cardiac monitor on. Pulse ox on. NIBP      

      on. Door closed. Noise minimized. Lights dimmed. Warm blanket given. Pillow given.          

10:04 Patient maintains SpO2 saturation greater than 95% on room air.                         kc6 

10:16 Inserted saline lock: 20 gauge in right antecubital area, using aseptic technique.      ss  

      Blood collected. Flushed with 10 mL NS.                                                     

10:20 Dayami Aleman RN is Primary Nurse.                                                 kc6 

10:26 Chest Single View XRAY In Process Unspecified.                                          EDMS

10:56 One-on-one care X 60 minutes.                                                           ss  

11:01 Repositioned patient. Bath given. Cleaned of incontinence. Linen changed.               kc6 

11:01 Cmmullen cath inserted, using sterile technique, 18 Fr., by me, balloon inflated, to       kc6 

      gravity drainage, clamped. urine specimen collected. returned clear yellow urine.           

      Patient tolerated poorly.                                                                   

11:27 ETOH Level Sent.                                                                        ko1 

11:37 Abdomen In Process Unspecified.                                                         EDMS

11:39 CT Head Brain wo Cont In Process Unspecified.                                           EDMS

13:30 Repositioned patient. Cleaned of incontinence. Linen changed.                           kc6 

13:54 Adarsh Chavez MD is Hospitalizing Provider.                                           rn  

16:04 Provided Education on: Lumbar Puncture, Procedure Consent.                              kc6 

16:04 One-on-one care X 60 minutes.                                                           kc6 

16:04 Repositioned patient. Cleaned of incontinence. Linen changed.                           kc6 

16:04 Assist provider with lumbar puncture: Set up LP tray. Performed by Gilbert Chavez MD CSF   kc6 

      is clear. Sample collected. Sample sent to lab. Puncture site dressed with 4X4s,            

      Procedure was successful. Patient tolerated well.                                           

17:04 SARS RAPID Sent.                                                                        kc6 

17:04 Flu Sent.                                                                               kc6 

17:04 Fluid Cell Count,Body Sent.                                                             kc6 

17:04 Body Fluid Culture Sent.                                                                kc6 

19:00 Report given to LAKISHA Castro \T\ LAKISHA Gaytan.                                                kc6

20:19 Patient admitted, IV remains in place.                                                  ay  

                                                                                                  

Restraints:                                                                                       

13:54 Non-Violent Restraint: Initial order obtained. 2024 at 13:54 Staff present kc6 

      on initiation: Mona Bullock RN \T\ Raysa Croft RN. Indications for initiating           

      restraints: Actions/Behavior observed: repeated attempts to remove/tamper with              

      lines/tubes/IV med devices \T\ wound dressing, repeated attempts to get up from bed/chair   

      w/o assistance, Confused/disoriented, has difficulty remembering/follow instructions,       

      has impaired decision making, has decreased level of consciousness, unable to follow        

      instructions, Less restrictive alternatives attempted: family at bedside, reoriented to     

      location, decrease environmental stimuli, medicated for pain/anxiety, eliminated            

      unnecessary lines/tubes, lines/tubes covered, medications evaluated, placed on bed          

      alarm, trained sitter in room, placed near Nurse station, performed diversional             

      activities, Alternative interventions: Ineffective. Clinical justification for use:         

      line protection, patient safety, Restraint Status: Soft wrist restraint (Right) started     

      Soft wrist restraint (Left) started Family notification/education Family notification:      

      Spouse/SO notified of restraint application. Family notified of restraint application.      

      Education Performed: Education provided to patient/family/spouse/SO/legally authorized      

      representative regarding the behaviors that necessitated restraint application and the      

      behaviors that the patient shall demonstrate to be released. Circulation: Warm/dry,         

      capillary refill WNL. Skin integrity: Intact, healthy with good turgor. Signs of injury     

      related to restraint: No injuries noted. Range of Motion performed. Level of distress \T\   

      agitation: agitated/restless, confused, Hydration/Food: PO fluids provided: tolerated       

      well. Elimination/Hygiene: with urinary catheter, diaper changed. Observed behaviors:       

      Attempting to tamper with airway/lines/wounds. unable to follow instructions.               

      confused/disoriented. repeated attempts get out of bed/chair. Less restrictive              

      alternatives attempted: Family at bedside. Reorient patient. Decrease environmental         

      stimuli. Medicated for pain/anxiety. eliminate unnecessary lines/tubes. cover               

      lines/tubes/wounds. bed alarm activated. Alt interventions: Ineffective, Clinical           

      justification for continued use Line protection. patient safety. Criteria to                

      discontinue restraint not met. Restraint status: Side rails up Continued. Soft wrist        

      restraint (Right) Soft wrist restraint (Left) Cognition: poor judgement, poor safety        

      awareness, impulsive, poor attention/concentration, unable to follow commands, short        

      term memory loss, Assuming responsibility of patient is restraints. Time restraints         

      initiated: 2024 at 13:54 Circumstances for use: Safety of staff. Safety of     

      patient. Line protection Current patient physical, emotional \T\ behavioral status:         

      confused, agitated/restless, unable to follow commands.                                     

15:54 Non-Violent Restraint: Circulation: Warm/dry, capillary refill WNL. Skin integrity:     kc6 

      Intact, healthy with good turgor. Signs of injury related to restraint: No injuries         

      noted. Range of Motion performed. Level of distress \T\ agitation: agitated/restless,       

      confused, Hydration/Food: PO fluids provided: tolerated well. Elimination/Hygiene: bed      

      bath provided, perineal care performed, with urinary catheter, diaper changed. Observed     

      behaviors: unable to follow instructions. confused/disoriented. Less restrictive            

      alternatives attempted: Family at bedside. Reorient patient. Decrease environmental         

      stimuli. Medicated for pain/anxiety. eliminate unnecessary lines/tubes. cover               

      lines/tubes/wounds. bed alarm activated. Alt interventions: Ineffective, Clinical           

      justification for continued use Line protection. patient safety. Criteria to                

      discontinue restraint not met. Restraint status: Side rails up Continued. Soft wrist        

      restraint (Right) Continued. Soft wrist restraint (Left) Continued. Cognition: poor         

      judgement, poor safety awareness, impulsive, poor attention/concentration, unable to        

      follow commands, short term memory loss.                                                    

17:54 Non-Violent Restraint: Circulation: Warm/dry, capillary refill WNL. Skin integrity:     kc6 

      Intact, healthy with good turgor. Signs of injury related to restraint: No injuries         

      noted. Range of Motion patient asleep. Level of distress \T\ agitation: confused, patient   

      asleep, Hydration/Food: patient asleep. Elimination/Hygiene: Patient asleep. perineal       

      care performed, with urinary catheter, diaper changed. Observed behaviors: unable to        

      follow instructions. confused/disoriented. Less restrictive alternatives attempted:         

      Family at bedside. Reorient patient. Decrease environmental stimuli. Medicated for          

      pain/anxiety. eliminate unnecessary lines/tubes. cover lines/tubes/wounds. bed alarm        

      activated. Alt interventions: Ineffective, Clinical justification for continued use         

      Line protection. patient safety. Criteria to discontinue restraint not met. Restraint       

      status: Side rails up Continued. Soft wrist restraint (Right) Continued. Soft wrist         

      restraint (Left) Continued. Cognition: poor judgement, poor safety awareness,               

      impulsive, poor attention/concentration, unable to follow commands, short term memory       

      loss.                                                                                       

19:00 Non-Violent Restraint: Transfer of care of a patient in restraints Report given to:     khai Vee RN \T\ Lucila Diaz RN.                                                     

19:30 Non-Violent Restraint: Circulation: Warm/dry, capillary refill WNL. Skin integrity:     ay  

      Intact, healthy with good turgor. Signs of injury related to restraint: No injuries         

      noted. Range of Motion performed. Level of distress \T\ agitation: agitated/restless,       

      confused, Elimination/Hygiene: perineal care performed, with urinary catheter, diaper       

      changed. Observed behaviors: unable to follow instructions. confused/disoriented.           

      repeated attempts get out of bed/chair. Less restrictive alternatives attempted: Family     

      at bedside. Reorient patient. Decrease environmental stimuli. Medicated for                 

      pain/anxiety. eliminate unnecessary lines/tubes. cover lines/tubes/wounds. Alt              

      interventions: Ineffective, Clinical justification for continued use Line protection.       

      patient safety. Criteria to discontinue restraint not met. Restraint status: Side rails     

      up Continued. Soft wrist restraint (Right) Continued. Soft wrist restraint (Left)           

      Continued. Cognition: poor judgement, poor safety awareness, impulsive, poor                

      attention/concentration, unable to follow commands, short term memory loss, Assuming        

      responsibility of patient is restraints. Circumstances for use: Safety of staff. Safety     

      of patient. Line protection Current patient physical, emotional \T\ behavioral status:      

      confused, agitated/restless, unable to follow commands.                                     

                                                                                                  

Administered Medications:                                                                         

10:02 CANCELLED (Duplicate Order): ns 0.9% (30 ml/kg) 30 ml/kg IV at bolus once; Sepsis       rn  

      Protocol; to be given as a bolus over 90 minutes                                            

11:00 Drug: NS 0.9% IV 1000 ml IV at 1000 ml once; to be given as a bolus over 60 minutes     kc6 

      Route: IV; Rate: 1000 ml; Site: right antecubital;                                          

12:00 Follow up: Response: No adverse reaction; IV Status: Completed infusion; IV Intake:     kc6 

      1000ml                                                                                      

11:00 Drug: NS 0.9% IV 1000 ml IV at 1000 ml once; to be given as a bolus over 60 minutes     kc6 

      Route: IV; Rate: 1000 ml; Site: right antecubital;                                          

12:00 Follow up: Response: No adverse reaction; IV Status: Completed infusion; IV Intake:     kc6 

      1000ml                                                                                      

11:29 Drug: Ativan IVP 0.5 mg IVP once Route: IVP; Site: right antecubital;                   kc6 

11:47 Follow up: Response: No adverse reaction; Anxiety decreased; RASS: Drowsy (-1)          kc6 

12:17 Drug: Rocephin IV 1 grams IV at calculated rate once; Given slow IV push per pharmacy   kc6 

      instructions Route: IV; Rate: calculated rate; Site: right antecubital;                     

12:45 Follow up: Response: No adverse reaction; IV Status: Completed infusion; IV Intake: [id number] 

12:17 Drug: Zithromax IVPB 500 mg IVPB once over 1 hrs; mix in 250 mL NS Route: IVPB; Infused kc6 

      Over: 1 hrs; Site: right antecubital;                                                       

13:17 Follow up: Response: No adverse reaction; IV Status: Completed infusion; IV Intake:     kc6 

      250ml                                                                                       

13:51 Drug: Ativan IVP 0.5 mg IVP once Route: IVP; Site: right antecubital;                   ko1 

14:12 Follow up: Response: No adverse reaction; Anxiety decreased; RASS: Drowsy (-1)          kc6 

16:04 Drug: Ativan IVP 0.5 mg IVP once Route: IVP; Site: right antecubital;                   kc6 

16:30 Follow up: Response: No adverse reaction; Anxiety decreased; RASS: Drowsy (-1)          kc6 

16:04 Drug: Ketamine IVP 0.2 mg/kg IVP once; Mix in 50 mL NS IV over 10 minutes. Maximum Dose kc6 

      10 mg {Note: a total of 75mg IV was administered per Dr. Chavez in the [id number].} Route:       

      IVP; Site: right antecubital;                                                               

16:30 Follow up: Response: No adverse reaction; Anxiety decreased; RASS: Moderate sedation    kc6 

      (-3)                                                                                        

17:07 Drug: Acetaminophen TN Suppository 650 mg TN once Route: TN;                            kc6 

17:30 Follow up: Response: No adverse reaction                                                kc6 

19:40 Drug: Acetaminophen TN Suppository 325 mg TN once Route: TN;                            ha1 

19:55 Follow up: Response: No adverse reaction                                                ay  

                                                                                                  

                                                                                                  

Medication:                                                                                       

20:19 VIS not applicable for this client.                                                     ay  

                                                                                                  

Intake:                                                                                           

12:00 IV: 1000ml; Total: 1000ml.                                                              kc6 

12:00 IV: 1000ml; Total: 2000ml.                                                              kc6 

12:45 IV: 10ml; Total: 2010ml.                                                                kc6 

13:17 IV: 250ml; Total: 2260ml.                                                               kc6 

                                                                                                  

Outcome:                                                                                          

13:55 Decision to Hospitalize by Provider.                                                    rn  

19:55 Instructed on the need for admit,                                                       ay  

19:55 Patient left the ED.                                                                    ay  

19:55 Admitted to ICU accompanied by nurse, via stretcher, room 1, with oxygen, on monitor,   ay  

      with chart,                                                                                 

19:55 Condition: stable                                                                       ay  

                                                                                                  

Signatures:                                                                                       

Dispatcher [employer]                           EDMS                                                 

Gilbert Chavez MD MD rn Blanchard, Shelby RN                   Lucila Mtz RN                        RN   Dayami Balderrama RN RN kc6 Oliver, Kathy, RN                       RN   koMylene Souza RN RN   ay                                                   

                                                                                                  

Corrections: (The following items were deleted from the chart)                                    

11:02 10:04 Allergies: Unable to obtain; kc6                                                  kc6 

11:51 11:47  / 73; Pulse 109bpm; Resp 32bpm; Pulse Ox 93% RA; kc6                       kc6 

14:17 14:08  / 93; Pulse 112bpm; Resp 28bpm; Spontaneous; Pulse Ox 95% 3 lpm Nasal      kc6 

      Cannula; kc6                                                                                

15:01 13:30 Reassessment: pt appears to be be getting increasingly agitate and thrashing      kc6 

      around in the bed with wife and sister at bedside. verbal reassurance given. MD made        

      aware. kc6                                                                                  

15:02 13:30 Reassessment: pt appears to be be getting increasingly agitated and thrashing     kc6 

      around in the bed with wife and sister at bedside. verbal reassurance given to pt. bed      

      alarm on and pt repositioned in the bed. MD made aware. kc6                                 

15:02 14:58 Reassessment: pt is grabbing and kicking at staff, attempting to get out bed      kc6 

      while being cleaned of incontinence. LAKISHA Dunn at [facility], RN at bedside kc6                 

15:02 13:30 Reassessment: pt is grabbing and kicking at staff, attempting to get out bed      kc6 

      while being cleaned of incontinence. LAKISHA Dunn at [facility], RN at bedside kc6                 

17:07 17:06  / 90; Pulse 109bpm; Resp 25bpm; Spontaneous; Pulse Ox 94% 3 lpm Nasal      kc6 

      Cannula; kc6                                                                                

19:33 17:22 Reassessment: attempted to call report to ICU. per LAKISHA Zuñiga they have to move  [facility] 

      a pt out before his bed is ready kc6                                                        

19:33 18:30 Reassessment: nurse to nurse report given to LAKISHA Zuñiga in the ICU [facility]           kc 

19:34 17:52 Reassessment: attempted to call report to ICU. per Song, they are still in the   [facility] 

      process of moving out a pt to accept this one. charge, rn made aware. [facility]                   

: 20:19 Admitted to ICU accompanied by nurse, via stretcher, with oxygen, on monitor,     ay  

      with chart, ay                                                                              

: 20:19 Condition: stable ay                                                              ay  

: 20:19 Instructed on the need for admit, ay                                              ay  

20: 20:21 Patient left the ED. ay                                                           ay  

                                                                                                  

**************************************************************************************************

## 2024-12-18 LAB
ALBUMIN SERPL BCP-MCNC: 3.4 G/DL (ref 3.4–5)
ALBUMIN/GLOB SERPL: 0.9 {RATIO} (ref 1.1–1.8)
ALP SERPL-CCNC: 58 U/L (ref 45–117)
ALT SERPL W P-5'-P-CCNC: 120 U/L (ref 16–61)
ANION GAP SERPL CALC-SCNC: 6.2 MEQ/L (ref 5–15)
ANION GAP SERPL CALC-SCNC: 7.8 MEQ/L (ref 5–15)
AST SERPL W P-5'-P-CCNC: 296 U/L (ref 15–37)
BUN BLD-MCNC: 14 MG/DL (ref 7–18)
BUN BLD-MCNC: 16 MG/DL (ref 7–18)
CRP SERPL-MCNC: 108 MG/L (ref ?–3)
GLOBULIN SER CALC-MCNC: 3.9 G/DL (ref 2.3–3.5)
GLUCOSE SERPLBLD-MCNC: 162 MG/DL (ref 74–106)
GLUCOSE SERPLBLD-MCNC: 183 MG/DL (ref 74–106)
HCT VFR BLD CALC: 38.6 % (ref 39.6–49)
HGB BLD-MCNC: 12.6 G/DL (ref 13.6–17.9)
LYMPHOCYTES # SPEC AUTO: 1.5 K/UL (ref 0.7–4.9)
MCH RBC QN AUTO: 28.7 PG (ref 27–35)
MCHC RBC AUTO-ENTMCNC: 32.6 G/DL (ref 32–36)
MCV RBC: 88 FL (ref 80–100)
MORPHOLOGY BLD-IMP: (no result)
NEUTROPHILS NFR FLD MANUAL: 60 % (ref 40–80)
NRBC # BLD: 0 10*3/UL (ref 0–0)
NRBC BLD AUTO-RTO: 0.2 % (ref 0–0)
PMV BLD: 11 FL (ref 7.6–11.3)
POTASSIUM SERPL-SCNC: 4.8 MEQ/L (ref 3.5–5.1)
POTASSIUM SERPL-SCNC: 5.2 MEQ/L (ref 3.5–5.1)
RBC # BLD: 4.38 M/UL (ref 4.33–5.43)
TOTAL CELLS COUNTED SPEC: 100
TSH SERPL DL<=0.05 MIU/L-ACNC: 1.72 UIU/ML (ref 0.36–3.74)
WBC # BLD AUTO: 8.9 THOU/UL (ref 4.3–10.9)

## 2024-12-18 PROCEDURE — 02HV33Z INSERTION OF INFUSION DEVICE INTO SUPERIOR VENA CAVA, PERCUTANEOUS APPROACH: ICD-10-PCS

## 2024-12-18 RX ADMIN — HYDROCORTISONE SODIUM SUCCINATE SCH MG: 100 INJECTION, POWDER, FOR SOLUTION INTRAMUSCULAR; INTRAVENOUS at 12:52

## 2024-12-18 RX ADMIN — INFLUENZA VIRUS VACCINE ONE: 15; 15; 15 SUSPENSION INTRAMUSCULAR at 07:45

## 2024-12-18 RX ADMIN — KETOROLAC TROMETHAMINE PRN MG: 30 INJECTION, SOLUTION INTRAMUSCULAR at 01:15

## 2024-12-18 RX ADMIN — DEXMEDETOMIDINE HYDROCHLORIDE SCH MLS/HR: 100 INJECTION, SOLUTION, CONCENTRATE INTRAVENOUS at 23:15

## 2024-12-18 RX ADMIN — DEXMEDETOMIDINE HYDROCHLORIDE SCH MLS/HR: 100 INJECTION, SOLUTION, CONCENTRATE INTRAVENOUS at 00:05

## 2024-12-18 RX ADMIN — SODIUM CHLORIDE, SODIUM LACTATE, POTASSIUM CHLORIDE, AND CALCIUM CHLORIDE ONE MLS: .6; .31; .03; .02 INJECTION, SOLUTION INTRAVENOUS at 10:08

## 2024-12-18 RX ADMIN — THIAMINE HYDROCHLORIDE SCH MG: 100 INJECTION, SOLUTION INTRAMUSCULAR; INTRAVENOUS at 12:52

## 2024-12-18 RX ADMIN — SODIUM CHLORIDE SCH MLS: 0.9 INJECTION, SOLUTION INTRAVENOUS at 20:17

## 2024-12-18 RX ADMIN — MUPIROCIN SCH APPL: 20 OINTMENT TOPICAL at 08:11

## 2024-12-18 RX ADMIN — LEVOFLOXACIN SCH MLS: 5 INJECTION, SOLUTION INTRAVENOUS at 12:52

## 2024-12-18 NOTE — P.CNS
Date of Consult: 12/18/24


Reason for Consult: Sepsis hypotension


Chief Complaint: AMS, pneumonia


History of Present Illness: 


Patient is 39 years of age unresponsive and was apparently began 2 days ago 

history available from the patient he reported a flulike symptom nausea vomiting

diarrhea.  Mental status


And admitted with multiorgan failure renal failure abnormal LFTs


Allergies





No Known Allergies Allergy (Unverified 12/17/24 21:55)


   





Home Medications: 








NK [No Home Meds]  12/17/24 








- Past Medical/Surgical History


-: CHF


-: Diabetes


-: Hypertension


Psychosocial/ Personal History: Patient lives at home with his wife, works at 

[employer]





- Social History


Smoking Status: Unknown if ever smoked


Alcohol use: No


CD- Drugs: No


Caffeine use: Yes


Place of Residence: Home





Review of Systems


is unable to be obtained





Physical Examination














Temp Pulse Resp BP Pulse Ox


 


 98.6 F   102 H  22 H  108/68   96 


 


 12/18/24 09:00  12/18/24 11:00  12/18/24 11:00  12/18/24 11:00  12/18/24 11:00








General: Unresponsive


Respiratory: Clear to auscultation bilaterally


Cardiovascular: No edema, Regular rate/rhythm, Normal S1 S2


Laboratory Data (last 24 hrs)











  12/17/24





  10:16


 


WBC  10.10


 


Hgb  13.8


 


Hct  42.7


 


Plt Count  113 L











- Problems


(1) Sepsis


Current Visit: Yes   Status: Acute   


Plan: 


Patient is 39 years of age admitted with altered mental status after flulike 

illness shows.  Mild lymphocytosis his procalcitonin level is elevated renal 

function liver function tests including lactic acid are all abnormal and a for 

atypical coverage with the levofloxacin add some hydrocortisone is no evidence 

of bacterial meningitis CT scan is also negative add thiamine blood pressure is 

low may need vasopressors


Qualifiers: 


   Sepsis acute organ dysfunction status: unspecified

## 2024-12-18 NOTE — P.CNS
Date of Consult: 12/18/24


Reason for Consult: SHIMA


Requesting Physician: Adarsh Chavez


Chief Complaint: AMS, pneumonia


History of Present Illness: 





39-year-old male with history of diabetes, hypertension, CHF presents emergency 

department chief complaint of altered mental status.  His wife reports that over

the course of the last 24 to 36 hours he had been having large amounts of 

diarrhea, vomiting and been becoming confused.  He was incontinent of urine and 

stool at home and had been becoming progressively more altered throughout the 

day.  For that reason he is brought to the emergency department.  





When he arrived to the emergency department he was lethargic, confused, oriented

x 1-2.  His labs were significant for a white blood cell count of 10.1 platelets

113 sodium 135 creatinine 1.75 GFR 50 glucose 179 lactic acid 3.4  ALT 

152 T. bili 0.8.  CT of the abdomen pelvis was performed which revealed no acute

or concerning abnormalities in the abdomen or pelvis, does show fatty liver.  CT

of the head was negative for acute findings, chest x-ray showed mild 

interstitial prominence greatest in the left upper lobe likely related to 

infection.





Given patient's significant altered mental status and lack of clear explanation 

lumbar puncture was also performed, spinal fluid was clear and is pending 

interpretation.








[id number]


 11:30 This 39 yrs old Black Male presents to ER via EMS with complaints of 

Altered Mental rn 


Status. 


11:30 The patient presents with decreased responsiveness. Onset: The 

symptoms/episode rn 


began/occurred 2 day(s) ago. Possible causes: unknown. Current symptoms: In the 


emergency department the patient's symptoms are unchanged from the initial 


presentation. It is unknown whether or not the patient has had similar symptoms 

in the 


past. EMS reports called out by family member for altered mental status and 

decreased 


responsiveness. Family member reported flulike symptoms and nausea and vomiting 

with 


diarrhea for the last 24 hours with decreased responsiveness today. No seizure 


activity. Normal glucose per EMS.. 


Allergies





No Known Allergies Allergy (Unverified 12/17/24 21:55)


   





Home Medications: 








NK [No Home Meds]  12/17/24 








- Past Medical/Surgical History


Diabetic: Yes


-: CHF


-: Diabetes


-: Hypertension


Psychosocial/ Personal History: Patient lives at home with his wife, works at 

[employer]





- Social History


Smoking Status: Unknown if ever smoked


Alcohol use: No


CD- Drugs: No


Caffeine use: Yes


Place of Residence: Home





Review of Systems


is unable to be obtained (AMS)





Physical Examination














Temp Pulse Resp BP Pulse Ox


 


 98.3 F   88   22 H  90/58 L  100 


 


 12/18/24 08:00  12/18/24 08:00  12/18/24 08:00  12/18/24 08:00  12/18/24 08:00








General: In no apparent distress


HEENT: Atraumatic


Neck: Supple


Respiratory: Diminished


Cardiovascular: No edema, Regular rate/rhythm


Gastrointestinal: Non-distended, No guarding


Musculoskeletal: No clubbing, No contractures


Integumentary: No rashes, No cyanosis


Laboratory Data (last 24 hrs)











  12/17/24 12/17/24





  10:16 10:16


 


WBC   10.10


 


Hgb   13.8


 


Hct   42.7


 


Plt Count   113 L


 


Sodium  135 L 


 


Potassium  3.9 


 


BUN  11 


 


Creatinine  1.75 H 


 


Glucose  179 H 


 


Total Bilirubin  0.8 


 


AST  386 H 


 


ALT  152 H 


 


Alkaline Phosphatase  78 








Imagings Data: 


[id number]


 EXAMINATION: ONE VIEW CHEST XR 


CLINICAL INDICATION: PICC Line Insertion 


TECHNIQUE: Frontal chest projection is submitted. Examination is limited by 

patient positioning and technique. 


COMPARISON: No prior exam. 


FINDINGS: 


Right-sided PICC line is in place with tip in the SVC. Bilateral pulmonary 

opacities are noted, mild. The heart is upper limit normal in size. 


IMPRESSION: 


Right PICC line has tip in the SVC without complication seen. 








[id number]


 EXAM: CT brain without contrast 


HISTORY: AMS 


COMPARISON: 2/5/2016 


TECHNIQUE: Multiple contiguous axial images were obtained and a CT of the brain 

without contrast. Sagittal and coronal reformats were performed. 


One or more of the following dose reduction techniques were used: Automated 

exposure control, adjustment of the mA and/or kV according to patient size, 

and/or iterative reconstruction. 


FINDINGS: 


No evidence of hydrocephalus, intracranial hemorrhage, or extra-axial fluid 

collection. 


The brain is normal in morphology. No evidence of midline shift or areas of 

brain edema. 


The calvarium is intact. The visualized paranasal sinuses and mastoid air cells 

are essentially clear. 


IMPRESSION: 


No evidence of acute intracranial abnormality. 








[id number]


 EXAMINATION: ONE VIEW CHEST XR 


CLINICAL INDICATION: AMS 


TECHNIQUE: Frontal chest projection is submitted. Examination is limited by pa

tient positioning and technique. 


COMPARISON: 3/15/2020 


FINDINGS: 


Mild interstitial prominence is seen, greatest in the left upper lobe. No focal 

consolidation developing bacterial pneumonia is seen. The heart is upper limit 

of normal in size. No displaced fractures 


identified. 


IMPRESSION: 


Mild interstitial prominence greatest in left upper lobe, likely related to 

infection. 








[id number]


 EXAMINATION: CT ABDOMEN AND PELVIS WITHOUT CONTRAST 


CLINICAL INDICATION: vomiting/diarrhea/AMS/hypotension 


TECHNIQUE: CT abdomen and pelvis was performed, without IV contrast, as per 

department protocol. Axial, sagittal and coronal reconstructions were obtained. 

One or more of the following dose reduction 


techniques were used: Automated exposure control, adjustment of the mA and kV 

according to the patient size, and iterative reconstruction. Unless otherwise 

specified, incidental findings do not 


require dedicated imaging follow-up. 


COMPARISON: No prior exam. 


FINDINGS: 


The lack of intravenous contrast limits the sensitivity of this exam for 

evaluation of solid visceral organs, vascular structures, and retroperitoneum. 


LOWER CHEST: The visualized lung bases are clear. 


LIVER:Mild fatty liver is present. No focal lesion or biliary dilatation is 

seen. Grossly unremarkable gallbladder. 


SPLEEN: Normal size. No focal lesion. 


PANCREAS: No mass, ductal dilation, or rick-pancreatic fluid. 


ADRENALS: Normal; no mass. 


KIDNEYS AND URETERS: Normal size and contour. No hydronephrosis. 


URINARY BLADDER: Normal contour. 


GASTROINTESTINAL TRACT: No evidence of bowel obstruction, significant free 

fluid, free air or abscess. 


APPENDIX: Normal appendix. 


LYMPH NODES: No lymphadenopathy. 


MUSCULOSKELETAL: No acute or suspicious osseous abnormality. 


ADDITIONAL FINDINGS: None. 


IMPRESSION: 


No acute or concerning abnormalities in the abdomen or pelvis, with evaluation 

limited by lack of IV contrast. 


Fatty liver. 


Conclusions/Impression: 





Stage II SHIMA may be due to hypovolemia with hypotension but the hematuria is 

concerning for Acute GN


CKD with Proteinuria suspected but the stage is unclear


Microscopic Hematuria


-No NSAIDs


-Continue IVF


-IVF bolus as ordered





Hyponatremia


-Continue IVF





Hyperkalemia


-Continue IVF





Septic Shock/ Influenza A


Hypotension


-Continue IVF


-IVF bolus as ordered


-Continue Abx





DM II with CKD


-RISS





Elevated LFTs


-Continue IVF


-IVF bolus as ordered





Anemia in chronic illness


-Monitor H&H





Toxic Metabolic Encephalopathy


-Continue supportive care











Hospitalist and ER notes reviewed


Thank you kindly for the consultation

## 2024-12-18 NOTE — RAD REPORT
EXAMINATION: ONE VIEW CHEST XR



CLINICAL INDICATION: PICC Line Insertion



TECHNIQUE: Frontal chest projection is submitted. Examination is limited by patient positioning and t
echnique.



COMPARISON: No prior exam.



FINDINGS:



Right-sided PICC line is in place with tip in the SVC. Bilateral pulmonary opacities are noted, mild.
 The heart is upper limit normal in size.



IMPRESSION: 



Right PICC line has tip in the SVC without complication seen. 







Reported By: Raman Morton 

Electronically Signed:  12/18/2024 7:46 AM

## 2024-12-18 NOTE — P.PN
Date of Service: 12/18/24


Subjective:


Confused/agitated overnight


Started on Precedex


Still drowsy/lethargic this morning


Reportedly was oriented x 2 last night but agitated requiring sedation


No other acute events overnight


Still with large amount of watery diarrhea





ROS: 


10 point ROS as noted above, otherwise negative








Physical exam


GEN: Lethargic/confused, NAD


HEENT: Normal conjunctiva, sclera anicteric


CV: Regular rate and rhythm, no edema


Pulm: Nonlabored respirations on room air


ABD: Soft, nontender, nondistended


MSK: No joint tenderness


Integumentary: No rashes


Neuro: Lethargic





Vitals reviewed








Assessment:


Severe sepsis secondary to influenza A/viral


Metabolic encephalopathy secondary to above


Acute kidney injury


Transaminitis


History of diabetes mellitus type 2noncompliant


History of CHFunknown EF


History of hypertension








Plan:


Severe sepsis secondary to influenza A/viral


Metabolic encephalopathy secondary to above


Positive for influenza A


Patient with profuse watery diarrhea


Confused/agitated


Will order Tamiflu but likely unable to take by mouth for now


Continue broad-spectrum antibiotics given concern for pneumonia, profuse 

diarrhea


Reevaluate antibiotics after cultures return


Pulmonary consult


C. difficile pending








Acute kidney injury


Continue IV fluids


Nephrology consult





Transaminitis


CT abdomen pelvis performed negative for acute findings, trend CMP


Some improvement in LFTs overnight





History of diabetes mellitus type 2noncompliant


History of CHFunknown EF


History of hypertension


Patient noncompliant with medication for at least the last few months per wife





DVT PPX: Heparin subcu 


Code status:full


Discharge Plan: Home


Plan to discharge in: Greater than 2 days





<Juvenal Lane - Last Filed: 12/18/24 09:02>





seen and examined on rounds this AM. 


plan discussed with NP Domingo as noted above





CSF clear, no evidence of meningitis


flu a +


encephalopathy


ativan and precedex given last night, hold sedation to see how patient is doing


continues with fever


inflammatory markers elevated


continues with loose stool


continue icu level of care








<Adarsh Chavez - Last Filed: 12/18/24 21:01>

## 2024-12-19 LAB
ALBUMIN SERPL BCP-MCNC: 2.9 G/DL (ref 3.4–5)
ALBUMIN/GLOB SERPL: 0.7 {RATIO} (ref 1.1–1.8)
ALP SERPL-CCNC: 52 U/L (ref 45–117)
ALT SERPL W P-5'-P-CCNC: 105 U/L (ref 16–61)
ANION GAP SERPL CALC-SCNC: 8.1 MEQ/L (ref 5–15)
ANION GAP SERPL CALC-SCNC: 9.6 MEQ/L (ref 5–15)
APTT BLD: 34.6 SECONDS (ref 24.3–36.9)
AST SERPL W P-5'-P-CCNC: 259 U/L (ref 15–37)
BUN BLD-MCNC: 18 MG/DL (ref 7–18)
BUN BLD-MCNC: 20 MG/DL (ref 7–18)
CREAT UR-SCNC: 49 MG/DL (ref 20–370)
GLOBULIN SER CALC-MCNC: 4.4 G/DL (ref 2.3–3.5)
GLUCOSE SERPLBLD-MCNC: 231 MG/DL (ref 74–106)
GLUCOSE SERPLBLD-MCNC: 359 MG/DL (ref 74–106)
HCT VFR BLD CALC: 36.5 % (ref 39.6–49)
HGB BLD-MCNC: 12 G/DL (ref 13.6–17.9)
INR BLD: 1.13
LYMPHOCYTES # SPEC AUTO: 0.9 K/UL (ref 0.7–4.9)
MAGNESIUM SERPL-MCNC: 1.8 MG/DL (ref 1.6–2.4)
MCH RBC QN AUTO: 28.7 PG (ref 27–35)
MCHC RBC AUTO-ENTMCNC: 32.8 G/DL (ref 32–36)
MCV RBC: 87.5 FL (ref 80–100)
MICROALBUMIN UR-MCNC: 10.1 MG/DL (ref ?–1.9)
MICROALBUMIN/CREAT UR-RTO: 206.1 (ref ?–30)
NRBC # BLD: 0 10*3/UL (ref 0–0)
NRBC BLD AUTO-RTO: 0.2 % (ref 0–0)
PMV BLD: 11.5 FL (ref 7.6–11.3)
POTASSIUM SERPL-SCNC: 6.1 MEQ/L (ref 3.5–5.1)
POTASSIUM SERPL-SCNC: 6.6 MEQ/L (ref 3.5–5.1)
PROT UR-MCNC: 45.7 MG/DL (ref ?–11.9)
PROTHROMBIN TIME: 12.6 SECONDS (ref 9.4–12.5)
RBC # BLD: 4.17 M/UL (ref 4.33–5.43)
SQUAMOUS URNS QL MICRO: <5 /HPF
UA COMPLETE W REFLEX CULTURE PNL UR: (no result)
UA COMPLETE W REFLEX CULTURE PNL UR: (no result)
URATE SERPL-MCNC: 8.3 MG/DL (ref 3.5–7.2)
WBC # BLD AUTO: 7.6 THOU/UL (ref 4.3–10.9)

## 2024-12-19 RX ADMIN — SODIUM CHLORIDE SCH: 4.5 INJECTION, SOLUTION INTRAVENOUS at 20:15

## 2024-12-19 RX ADMIN — OSELTAMIVIR PHOSPHATE SCH: 6 FOR SUSPENSION ORAL at 21:00

## 2024-12-19 RX ADMIN — HUMAN INSULIN SCH UNIT: 100 INJECTION, SOLUTION SUBCUTANEOUS at 11:16

## 2024-12-19 RX ADMIN — SODIUM CHLORIDE SCH MLS: 0.45 INJECTION, SOLUTION INTRAVENOUS at 21:03

## 2024-12-19 RX ADMIN — MAGNESIUM SULFATE IN DEXTROSE ONE MLS: 10 INJECTION, SOLUTION INTRAVENOUS at 07:37

## 2024-12-19 RX ADMIN — HUMAN INSULIN ONE UNIT: 100 INJECTION, SOLUTION SUBCUTANEOUS at 07:38

## 2024-12-19 RX ADMIN — DEXTROSE MONOHYDRATE ONE GM: 25 INJECTION, SOLUTION INTRAVENOUS at 07:36

## 2024-12-19 RX ADMIN — SODIUM CHLORIDE SCH MLS: 0.9 INJECTION, SOLUTION INTRAVENOUS at 20:39

## 2024-12-19 NOTE — EKG
Test Date:    2024-12-17               Test Time:    10:15:55

Technician:   LANA                                     

                                                     

MEASUREMENT RESULTS:                                       

Intervals:                                           

Rate:         111                                    

CO:           150                                    

QRSD:         100                                    

QT:           344                                    

QTc:          467                                    

Axis:                                                

P:            70                                     

CO:           150                                    

QRS:          44                                     

T:            -36                                    

                                                     

INTERPRETIVE STATEMENTS:                                       

                                                     

Sinus tachycardia

Nonspecific ST and T wave abnormality

Abnormal ECG

Compared to ECG 09/30/2004 11:43:00

ST (T wave) deviation now present

Sinus bradycardia no longer present

Sinus arrhythmia no longer present



Electronically Signed On 12-19-24 11:30:24 CST by Mohinder Bains

## 2024-12-19 NOTE — P.PN
Date of Service: 12/19/24








Subjective:


obtunded, not responsive to pain, back on precedex gtt this morning, was given 

ativan last night


Hyperkalemia, treated with insulin/D50w











ROS: 


10 point ROS as noted above, otherwise negative








Physical exam


GEN: obtunded, NAD


HEENT: Normal conjunctiva, sclera anicteric


CV: RRR, no edema


Pulm: Nonlabored respirations, on room air


ABD: Soft on palpation, ND/NT, hypoactive bowel sounds


MSK: No joint tenderness


Integumentary: No rashes


Neuro: obtunded











Vitals reviewed














Assessment:


Severe sepsis secondary to influenza A/viral


Metabolic encephalopathy secondary to above


Acute kidney injury


Hyperkalemia


Transaminitis


History of diabetes mellitus type 2noncompliant


History of CHFunknown EF


History of hypertension














Plan:


Severe sepsis secondary to influenza A/viral


Metabolic encephalopathy secondary to above


Positive for influenza A


Patient with profuse watery diarrhea


Confused/agitated


Will order Tamiflu but likely unable to take by mouth for now


Continue broad-spectrum antibiotics given concern for pneumonia, profuse 

diarrhea


Reevaluate antibiotics after cultures return


Pulmonary consult


C. difficile pending


Procal, CK, and CRP significantly elevated


Spinal fluid profile not consistent with bacterial meningitis 








Acute kidney injury


Continue IV fluids


Nephrology consult








Hyperkalemia


K 6.6, repeat 6.1


Insulin 10 units with D50 gm IV


Will monitor in AM labs








Transaminitis


CT abdomen pelvis performed negative for acute findings, trend CMP


Some improvement in LFTs overnight








History of diabetes mellitus type 2noncompliant


History of CHFunknown EF


History of hypertension


Patient noncompliant with medication for at least the last few months per wife








DVT PPX: Heparin subcu 


Code status:full


Discharge Plan: Home


Plan to discharge in: Greater than 2 days








<Gris Yan - Last Filed: 12/19/24 19:05>





DOS (12/19/24)





Patient seen and examined on rounds this morning with NP Yuriy. I personally 

spent 35 minutes of critical care time managing the patient's care at bedside, 

reviewing results, and discussing with consultants.


I performed a substantial part of the MDM during this patient's care today as 

noted above in the plan of care. I agree with plan of care as noted above with 

the following additions / corrections:





ativan and back on precedex drip last night, due to agitation, would swing at 

staff/kick when he would wake / when cleaning him / repositioning


patient difficult to arouse this morning


does seem to need less ativan/precedex, so suspect he is improving


SHIMA slightly improved with IVF


potassium back up to 6.1 - currently unable to take PO due to mentation, given 

insulin with slight improvement to 6.1


no clear source for hyperK, expanded workup started


CPK > 14k (12/19 afternoon)


rhabdo can lead to hyperkalemia


rhabdo has been associated with influenza A


will f/u UOP today and adjust IVF accordingly





continue ICU level of care








<Adarsh Chavez - Last Filed: 12/20/24 06:28>

## 2024-12-19 NOTE — P.PN
Date of Service: 12/19/24


Vital Signs











Temp Pulse Resp BP Pulse Ox


 


 97.2 F   64   14   94/61   100 


 


 12/19/24 16:00  12/19/24 19:00  12/19/24 19:00  12/19/24 19:00  12/19/24 19:00





Medications





Acetaminophen (Acetaminophen 325 Mg Tablet)  650 mg PO Q4HP PRN


   PRN Reason: TEMP > 100' F


Acetaminophen (Acetaminophen 650mg/Rect Supp)  650 mg NH Q4H PRN


   PRN Reason: TEMP > 100' F


   Last Admin: 12/19/24 01:20 Dose:  650 mg


   


Glucagon (Glucagon 1 Mg/Vial)  1 mg IM 1X PRN


   PRN Reason: HYPOGLYCEMIA


Heparin Sodium (Porcine) (Heparin 5000 Unit/Ml 1 Ml Vial)  5,000 unit SQ Q12HR 

[facility]ANELLE


   Last Admin: 12/19/24 20:18 Dose:  5,000 unit


   


Hydrocortisone Sodium Succinate (Hydrocortisone Suc 100 Mg Inj)  100 mg IV Q12HR

JANELLE


   Last Admin: 12/19/24 20:18 Dose:  100 mg


   


Cefepime HCl 2 gm/ Sodium (Chloride)  100 mls @ 200 mls/hr IV Q12HR [facility]AN[facility]; 

Protocol


   Last Admin: 12/19/24 20:17 Dose:  100 mls


   


Metronidazole/Sodium Chloride (Flagyl 500mg/100 Ml Iv Premix)  500 mg in 100 mls

@ 200 mls/hr IV Q8HR JANELLE; Protocol


   Last Admin: 12/19/24 16:10 Dose:  100 mls


   


Dextrose (Dextrose 10% Water Iv Soln.)  125 mls @ 0 mls/hr IV PRN PRN; Protocol


   PRN Reason: HYPOGLYCEMIA


Vancomycin HCl 2 gm/ Sodium (Chloride)  500 mls @ 250 mls/hr IVPB Q24H JANELLE


   Last Admin: 12/18/24 20:17 Dose:  500 mls


   


Levofloxacin/Dextrose (Levaquin 750 Mg/150 Ml Ivpb (Premix))  750 mg in 150 mls 

@ 100 mls/hr IV Q48H JANELLE; Protocol


   Last Admin: 12/18/24 12:52 Dose:  150 mls


   


Dexmedetomidine HCl 1,000 mcg/ (Sodium Chloride)  500 mls @ 12.428 mls/hr IV TI

TR JANELLE; Protocol


   Last Titration: 12/19/24 18:15 Dose:  0.3 mcg/kg/hr, 18.6 mls/hr


   


Sodium Chloride (Ns 1000 Ml Ivbag)  1,000 mls @ 150 mls/hr IV .Q6H40M JANELLE


Sodium Chloride (Sodium Chloride 0.45%)  500 mls @ 500 mls/hr IV .Q1H [facility]


   Stop: 12/19/24 21:14


Insulin Human Regular (Insulin Regular (Human) 100 Unit/Ml)  0 unit SQ Q6HR [facility];

Protocol


   Last Admin: 12/19/24 17:39 Dose:  4 unit


   


Lorazepam (Lorazepam 2 Mg/Ml Vial)  1 mg IV Q2H PRN


   PRN Reason: SEDATION


   Last Admin: 12/19/24 05:20 Dose:  1 mg


   


Mupirocin (Mupirocin Nasal 2 Appl/1 Gm Tube)  1 appl VIRIDIANA BID [facility]


   Stop: 12/22/24 21:01


   Last Admin: 12/19/24 20:18 Dose:  1 appl


   


Ondansetron HCl (Ondansetron 4 Mg/2 Ml Vial)  4 mg IV Q6HP PRN


   PRN Reason: NAUSEA / VOMITING


Oseltamivir Phosphate (Oseltamivir Phosphate 30 Mg/5 Ml Suspension Ud)  75 mg PO

BID [facility]


   Stop: 12/24/24 21:01


Thiamine HCl (Thiamine 200 Mg/2 Ml Inj)  200 mg IVP BID [facility]


   Last Admin: 12/19/24 20:18 Dose:  200 mg


   


Microbiology Results





12/17/24 11:45   Blood  - Blood   Aerobic Blood Culture - Preliminary


                            No growth in 24 hours.


12/17/24 11:45   Blood  - Blood   Anaerobic Blood Culture - Preliminary


                            No growth in 24 hours.


12/17/24 10:16   Blood  - Blood   Aerobic Blood Culture - Preliminary


                            No growth in 24 hours.


12/17/24 10:16   Blood  - Blood   Anaerobic Blood Culture - Preliminary


                            No growth in 24 hours.





Assessment/ Plan: 


Nephrology





Limited IH/ROS due to AMS





Vitals, medications, blood work and imaging reviewed in the chart





General: In no apparent distress


HEENT: Atraumatic


Neck: Supple


Respiratory: Diminished


Cardiovascular: No edema, Regular rate/rhythm


Gastrointestinal: Non-distended, No guarding


Musculoskeletal: No clubbing, No contractures


Integumentary: No rashes, No cyanosis





Laboratory Data (last 24 hrs)











  12/17/24 12/17/24





  10:16 10:16


 


WBC   10.10


 


Hgb   13.8


 


Hct   42.7


 


Plt Count   113 L


 


Sodium  135 L 


 


Potassium  3.9 


 


BUN  11 


 


Creatinine  1.75 H 


 


Glucose  179 H 


 


Total Bilirubin  0.8 


 


AST  386 H 


 


ALT  152 H 


 


Alkaline Phosphatase  78 








Imagings Data: 


[id number]


 EXAMINATION: ONE VIEW CHEST XR 


CLINICAL INDICATION: PICC Line Insertion 


TECHNIQUE: Frontal chest projection is submitted. Examination is limited by 

patient positioning and technique. 


COMPARISON: No prior exam. 


FINDINGS: 


Right-sided PICC line is in place with tip in the SVC. Bilateral pulmonary opaci

ties are noted, mild. The heart is upper limit normal in size. 


IMPRESSION: 


Right PICC line has tip in the SVC without complication seen. 








[id number]


 EXAM: CT brain without contrast 


HISTORY: AMS 


COMPARISON: 2/5/2016 


TECHNIQUE: Multiple contiguous axial images were obtained and a CT of the brain 

without contrast. Sagittal and coronal reformats were performed. 


One or more of the following dose reduction techniques were used: Automated 

exposure control, adjustment of the mA and/or kV according to patient size, 

and/or iterative reconstruction. 


FINDINGS: 


No evidence of hydrocephalus, intracranial hemorrhage, or extra-axial fluid 

collection. 


The brain is normal in morphology. No evidence of midline shift or areas of 

brain edema. 


The calvarium is intact. The visualized paranasal sinuses and mastoid air cells 

are essentially clear. 


IMPRESSION: 


No evidence of acute intracranial abnormality. 








[id number]


 EXAMINATION: ONE VIEW CHEST XR 


CLINICAL INDICATION: AMS 


TECHNIQUE: Frontal chest projection is submitted. Examination is limited by 

patient positioning and technique. 


COMPARISON: 3/15/2020 


FINDINGS: 


Mild interstitial prominence is seen, greatest in the left upper lobe. No focal 

consolidation developing bacterial pneumonia is seen. The heart is upper limit 

of normal in size. No displaced fractures 


identified. 


IMPRESSION: 


Mild interstitial prominence greatest in left upper lobe, likely related to 

infection. 








[id number]


 EXAMINATION: CT ABDOMEN AND PELVIS WITHOUT CONTRAST 


CLINICAL INDICATION: vomiting/diarrhea/AMS/hypotension 


TECHNIQUE: CT abdomen and pelvis was performed, without IV contrast, as per 

department protocol. Axial, sagittal and coronal reconstructions were obtained. 

One or more of the following dose reduction 


techniques were used: Automated exposure control, adjustment of the mA and kV 

according to the patient size, and iterative reconstruction. Unless otherwise 

specified, incidental findings do not 


require dedicated imaging follow-up. 


COMPARISON: No prior exam. 


FINDINGS: 


The lack of intravenous contrast limits the sensitivity of this exam for 

evaluation of solid visceral organs, vascular structures, and retroperitoneum. 


LOWER CHEST: The visualized lung bases are clear. 


LIVER:Mild fatty liver is present. No focal lesion or biliary dilatation is 

seen. Grossly unremarkable gallbladder. 


SPLEEN: Normal size. No focal lesion. 


PANCREAS: No mass, ductal dilation, or rick-pancreatic fluid. 


ADRENALS: Normal; no mass. 


KIDNEYS AND URETERS: Normal size and contour. No hydronephrosis. 


URINARY BLADDER: Normal contour. 


GASTROINTESTINAL TRACT: No evidence of bowel obstruction, significant free 

fluid, free air or abscess. 


APPENDIX: Normal appendix. 


LYMPH NODES: No lymphadenopathy. 


MUSCULOSKELETAL: No acute or suspicious osseous abnormality. 


ADDITIONAL FINDINGS: None. 


IMPRESSION: 


No acute or concerning abnormalities in the abdomen or pelvis, with evaluation 

limited by lack of IV contrast. 


Fatty liver. 





Conclusions/Impression: 





Stage II SHIMA may be due to hypovolemia with hypotension complicated by 

rhabdomyolysis


CKD with Proteinuria suspected but the stage is unclear


Microscopic Hematuria


-No NSAIDs


-Increase IVF


-IVF bolus as ordered





Hyponatremia


-Continue IVF





Hyperkalemia


-Continue IVF





Septic Shock/ Influenza A


Hypotension


-Continue IVF


-IVF bolus as ordered


-Continue Abx





DM II with CKD


-RISS





Elevated LFTs


-Continue IVF





Anemia in chronic illness


-Monitor H&H





Toxic Metabolic Encephalopathy


-Continue supportive care











Hospitalist note reviewed


Case discussed with Dr. Chavez


Patient care 35min

## 2024-12-20 LAB
ALBUMIN SERPL BCP-MCNC: 2.8 G/DL (ref 3.4–5)
ALBUMIN/GLOB SERPL: 0.7 {RATIO} (ref 1.1–1.8)
ALP SERPL-CCNC: 43 U/L (ref 45–117)
ALT SERPL W P-5'-P-CCNC: 83 U/L (ref 16–61)
ANION GAP SERPL CALC-SCNC: 5.2 MEQ/L (ref 5–15)
AST SERPL W P-5'-P-CCNC: 167 U/L (ref 15–37)
BUN BLD-MCNC: 23 MG/DL (ref 7–18)
GLOBULIN SER CALC-MCNC: 4 G/DL (ref 2.3–3.5)
GLUCOSE SERPLBLD-MCNC: 236 MG/DL (ref 74–106)
HCT VFR BLD CALC: 34.4 % (ref 39.6–49)
HGB BLD-MCNC: 11.2 G/DL (ref 13.6–17.9)
LYMPHOCYTES # SPEC AUTO: 0.7 K/UL (ref 0.7–4.9)
MAGNESIUM SERPL-MCNC: 2.3 MG/DL (ref 1.6–2.4)
MCH RBC QN AUTO: 28.8 PG (ref 27–35)
MCHC RBC AUTO-ENTMCNC: 32.4 G/DL (ref 32–36)
MCV RBC: 88.7 FL (ref 80–100)
NRBC # BLD: 0 10*3/UL (ref 0–0)
NRBC BLD AUTO-RTO: 0.1 % (ref 0–0)
PMV BLD: 11.2 FL (ref 7.6–11.3)
POTASSIUM SERPL-SCNC: 6.2 MEQ/L (ref 3.5–5.1)
RBC # BLD: 3.88 M/UL (ref 4.33–5.43)
WBC # BLD AUTO: 6.5 THOU/UL (ref 4.3–10.9)

## 2024-12-20 RX ADMIN — FUROSEMIDE ONE MG: 10 INJECTION, SOLUTION INTRAVENOUS at 11:23

## 2024-12-20 RX ADMIN — SODIUM ZIRCONIUM CYCLOSILICATE ONE: 10 POWDER, FOR SUSPENSION ORAL at 12:00

## 2024-12-20 RX ADMIN — DEXTROSE AND SODIUM CHLORIDE SCH MLS: 5; .45 INJECTION, SOLUTION INTRAVENOUS at 11:24

## 2024-12-20 RX ADMIN — DEXTROSE MONOHYDRATE ONE GM: 25 INJECTION, SOLUTION INTRAVENOUS at 10:37

## 2024-12-20 RX ADMIN — HUMAN INSULIN ONE UNIT: 100 INJECTION, SOLUTION SUBCUTANEOUS at 10:38

## 2024-12-20 RX ADMIN — SODIUM CHLORIDE SCH MLS: 0.45 INJECTION, SOLUTION INTRAVENOUS at 06:38

## 2024-12-20 RX ADMIN — ALBUTEROL SULFATE ONE MG: 2.5 SOLUTION RESPIRATORY (INHALATION) at 13:17

## 2024-12-20 RX ADMIN — ALBUTEROL SULFATE ONE: 2.5 SOLUTION RESPIRATORY (INHALATION) at 10:33

## 2024-12-20 NOTE — P.PN
Date of Service: 12/20/24








Subjective:








Difficult to arouse, reports that he is agitated requiring precedex gtt. 


Coughing, clearing his throat 


Elevated CK, IVF increased yesterday, potassium elevated 6.2, insulin shift 

ordered








ROS: 


10 point ROS as noted above, otherwise negative








Physical exam


GEN: obtunded, NAD


HEENT: Normal conjunctiva, sclera anicteric


CV: Regular rate and rhythm, S1 S2 present, no edema


Pulm: Nonlabored respirations, expiratory wheezing, on room air


ABD: Soft and benign on palpation, ND/NT, hypoactive bowel sounds


MSK: No joint tenderness


Integumentary: No rashes


Neuro: obtunded











Vitals reviewed














Assessment:


Severe sepsis secondary to influenza A/viral


Metabolic encephalopathy secondary to above


Acute kidney injury


Hyperkalemia 2/2 Rhabdomyolisis


Transaminitis


History of diabetes mellitus type 2noncompliant


History of CHFunknown EF


History of hypertension














Plan:


Severe sepsis secondary to influenza A/viral


Metabolic encephalopathy secondary to above


Positive for influenza A


Patient with profuse watery diarrhea


Confused/agitated


Will order Tamiflu but likely unable to take by mouth for now


Continue broad-spectrum antibiotics given concern for pneumonia, profuse 

diarrhea


Reevaluate antibiotics after cultures return, blood cultures NGTD


Pulmonary consult


C. difficile pending


Procal, CK, and CRP significantly elevated


Spinal fluid profile not consistent with bacterial meningitis 








Acute kidney injury


Continue IV fluids


Nephrology consult








Hyperkalemia 2/2 Rhabdomyolisis 


K 6.2, repeat at 1500


Insulin 10 units with D50 gm IV x1


Will monitor in AM labs x1


IVF increased 12/19








Transaminitis


CT abdomen pelvis performed negative for acute findings, trend CMP


Continues with slow improvement in LFTs overnight








History of diabetes mellitus type 2noncompliant


History of CHFunknown EF


History of hypertension


Patient noncompliant with medication for at least the last few months per wife








DVT PPX: Heparin subcu 


Code status:full


Discharge Plan: Home


Plan to discharge in: Greater than 2 days

## 2024-12-20 NOTE — P.PN
Nephrology





(S) Pt remains in the ICU, lethargic on low dose Precedex drip per ICU/primary 

team, remains on IVF, UOP acceptable, not needing O2, no resp distress, 

occasional cough. 





Vitals, medications, blood work and imaging reviewed in the chart





General: In no apparent distress, lethargic


HEENT: Atraumatic, not on O2


Neck: Supple


Respiratory: b/l air entry without sig rhonchi or wheezing


Cardiovascular: No LE edema, Regular rate/rhythm


Gastrointestinal: Non-distended, soft, mild upper quadrant TTP


Musculoskeletal: No contractures, no myalgias


Integumentary: Lethargic, briefly grumbles with physical stimuli, moves UE 

spont, no tremors or myoclonus observed 





Laboratory Data (last 24 hrs)


Reviewed in the EMR








Conclusions/Impression: 





Stage I SHIMA on admission, multifactorial with Cr level downward trending


Possible earlier stage CKD NOS in the setting of DM/urine studies with 

albuminuria but any proteinuria in the setting of current SHIMA may be tubular, 

inaccurate and needs repeating


Microscopic Hematuria, unspecified in the setting of SHIMA, rhabd, other





-Cont aggressive IVF hydration but monitor I/O's closely





Hyperkalemia in the setting of above and rhabdo


-Will dose D50/Insulin/albuterol and switch IVF to bicarb added. When pt is more

awake, will administer Lokelma. Will repeat level this afternoon





Acute rhabdomyolysis 


-In the setting of illness +/- other, no UDS performed on admission. CPK level 

remains > 10K, urine pH low, switch to bicarb added IVF to alkalanize urine, 

trend CPK daily. Dose lasix PRN





Influenza A illness, PNA


-Resp status stable, management per IM

## 2024-12-21 LAB
ALBUMIN SERPL BCP-MCNC: 2.9 G/DL (ref 3.4–5)
ALBUMIN/GLOB SERPL: 0.8 {RATIO} (ref 1.1–1.8)
ALP SERPL-CCNC: 42 U/L (ref 45–117)
ALT SERPL W P-5'-P-CCNC: 64 U/L (ref 16–61)
ANION GAP SERPL CALC-SCNC: 8.1 MEQ/L (ref 5–15)
AST SERPL W P-5'-P-CCNC: 113 U/L (ref 15–37)
BUN BLD-MCNC: 27 MG/DL (ref 7–18)
GLOBULIN SER CALC-MCNC: 3.5 G/DL (ref 2.3–3.5)
GLUCOSE SERPLBLD-MCNC: 234 MG/DL (ref 74–106)
HCT VFR BLD CALC: 33.2 % (ref 39.6–49)
HGB BLD-MCNC: 11 G/DL (ref 13.6–17.9)
LYMPHOCYTES # SPEC AUTO: 0.9 K/UL (ref 0.7–4.9)
MCH RBC QN AUTO: 28.9 PG (ref 27–35)
MCHC RBC AUTO-ENTMCNC: 33.1 G/DL (ref 32–36)
MCV RBC: 87.3 FL (ref 80–100)
NRBC # BLD: 0 10*3/UL (ref 0–0)
NRBC BLD AUTO-RTO: 0.3 % (ref 0–0)
PMV BLD: 11.1 FL (ref 7.6–11.3)
POTASSIUM SERPL-SCNC: 4.1 MEQ/L (ref 3.5–5.1)
RBC # BLD: 3.8 M/UL (ref 4.33–5.43)
WBC # BLD AUTO: 7 THOU/UL (ref 4.3–10.9)

## 2024-12-21 RX ADMIN — MORPHINE SULFATE PRN MG: 2 INJECTION, SOLUTION INTRAMUSCULAR; INTRAVENOUS at 08:58

## 2024-12-21 RX ADMIN — DEXTROSE AND SODIUM CHLORIDE SCH MLS: 5; .45 INJECTION, SOLUTION INTRAVENOUS at 10:34

## 2024-12-21 RX ADMIN — LEVOFLOXACIN SCH MLS: 5 INJECTION, SOLUTION INTRAVENOUS at 12:00

## 2024-12-21 RX ADMIN — SODIUM CHLORIDE ONE MLS: 0.9 INJECTION, SOLUTION INTRAVENOUS at 08:36

## 2024-12-21 NOTE — P.PN
Name: Carlos Paredes ADMIT: 2023   : 1946  PCP: Yas Sanchez MD    MRN: [id number] Date of Service: 12/21/24








Subjective:








Precedex and ativan off, now awake and feeling agitated


He cannot follow commands this morning, eyes open, and he calls out








ROS: 


10 point ROS as noted above, otherwise negative








Physical exam


GEN: awake, not following commands, NAD


HEENT: Normal conjunctiva, sclera anicteric


CV: NSR, S1 S2 present, no edema


Pulm: Nonlabored respirations, on room air


ABD: Soft and benign on palpation, ND/NT, hypoactive bowel sounds


MSK: No joint tenderness


Integumentary: No rashes


Neuro: agitated











Vitals reviewed














Assessment:


Severe sepsis secondary to influenza A/viral


Metabolic encephalopathy secondary to above


Acute kidney injury


Hypophosphatemia


Hyperkalemia 2/2 Rhabdomyolisis


Transaminitis


History of diabetes mellitus type 2noncompliant


History of CHFunknown EF


History of hypertension














Plan:


Severe sepsis secondary to influenza A/viral


Metabolic encephalopathy secondary to above 


Positive for influenza A


Patient with profuse watery diarrhea


Confused/agitated


Will order Tamiflu but likely unable to take by mouth for now


Continue broad-spectrum antibiotics given concern for pneumonia, profuse 

diarrhea


Reevaluate antibiotics after cultures return, blood cultures NGTD


Pulmonary consult


C. difficile pending


Procal, CK, and CRP significantly elevated


Spinal fluid profile not consistent with bacterial meningitis 








Acute kidney injury


Continue IV fluids


Nephrology consult








Hypophosphatemia


-Phos 1.2


-replace and follow labs in the AM








Hyperkalemia 2/2 Rhabdomyolisis-improved


K 4.1 12/21


CK >87768, >32675, 6893


Will monitor in AM labs x1


IVF sodium [id number]








Transaminitis


CT abdomen pelvis performed negative for acute findings, trend CMP


Continues with slow improvement in LFTs overnight








History of diabetes mellitus type 2noncompliant


History of CHFunknown EF


History of hypertension


Patient noncompliant with medication for at least the last few months per wife








DVT PPX: Heparin subcu 


Code status:full


Discharge Plan: Home


Plan to discharge in: Greater than 2 days Harvey Howard MD  Lyons Hospitalist 

## 2024-12-22 LAB
ALBUMIN SERPL BCP-MCNC: 2.7 G/DL (ref 3.4–5)
ALBUMIN/GLOB SERPL: 0.8 {RATIO} (ref 1.1–1.8)
ALP SERPL-CCNC: 38 U/L (ref 45–117)
ALT SERPL W P-5'-P-CCNC: 56 U/L (ref 16–61)
ANION GAP SERPL CALC-SCNC: 8.1 MEQ/L (ref 5–15)
AST SERPL W P-5'-P-CCNC: 123 U/L (ref 15–37)
BLD SMEAR INTERP: (no result)
BUN BLD-MCNC: 19 MG/DL (ref 7–18)
GLOBULIN SER CALC-MCNC: 3.3 G/DL (ref 2.3–3.5)
GLUCOSE SERPLBLD-MCNC: 154 MG/DL (ref 74–106)
HCT VFR BLD CALC: 34.1 % (ref 39.6–49)
HGB BLD-MCNC: 11.3 G/DL (ref 13.6–17.9)
LYMPHOCYTES # SPEC AUTO: 2.3 K/UL (ref 0.7–4.9)
MAGNESIUM SERPL-MCNC: 2 MG/DL (ref 1.6–2.4)
MCH RBC QN AUTO: 28.8 PG (ref 27–35)
MCHC RBC AUTO-ENTMCNC: 33.2 G/DL (ref 32–36)
MCV RBC: 86.6 FL (ref 80–100)
MORPHOLOGY BLD-IMP: (no result)
NRBC # BLD: 0 10*3/UL (ref 0–0)
NRBC BLD AUTO-RTO: 0.5 % (ref 0–0)
PMV BLD: 10.7 FL (ref 7.6–11.3)
POTASSIUM SERPL-SCNC: 3.1 MEQ/L (ref 3.5–5.1)
POTASSIUM SERPL-SCNC: 3.4 MEQ/L (ref 3.5–5.1)
RBC # BLD: 3.93 M/UL (ref 4.33–5.43)
WBC # BLD AUTO: 6 THOU/UL (ref 4.3–10.9)

## 2024-12-22 RX ADMIN — Medication ONE MLS: at 06:03

## 2024-12-22 RX ADMIN — DEXTROSE AND SODIUM CHLORIDE SCH MLS: 5; .45 INJECTION, SOLUTION INTRAVENOUS at 16:40

## 2024-12-22 RX ADMIN — Medication ONE MLS: at 19:10

## 2024-12-22 RX ADMIN — POTASSIUM CHLORIDE SCH MLS: 200 INJECTION, SOLUTION INTRAVENOUS at 08:55

## 2024-12-22 NOTE — P.PN
Date of Service: 12/22/24








Subjective:








One dose of ativan overnight


On morning rounds, he was unable to wake up.


This afternoon, starting to speak


Remains on RA, serum glucose better








ROS: 


10 point ROS as noted above, otherwise negative








Physical exam


GEN: Sleeping, NAD


HEENT: Normal conjunctiva, sclera anicteric


CV: NSR, S1 S2 present, no edema


Pulm: Nonlabored respirations, on room air


ABD: Soft and benign on palpation, ND/NT, hypoactive bowel sounds


MSK: No joint tenderness


Integumentary: No rashes


Neuro: agitated











Vitals reviewed














Assessment:


Severe sepsis secondary to influenza A/viral


Metabolic encephalopathy secondary to above


Acute kidney injury


Hypophosphatemia


Hyperkalemia 2/2 Rhabdomyolisis


Transaminitis


History of diabetes mellitus type 2noncompliant


History of CHFunknown EF


History of hypertension














Plan:


Severe sepsis secondary to influenza A/viral


Metabolic encephalopathy secondary to above 


Positive for influenza A


Patient with profuse watery diarrhea


Confused/agitated


Will order Tamiflu but likely unable to take by mouth for now


Continue broad-spectrum antibiotics given concern for pneumonia, profuse 

diarrhea


Reevaluate antibiotics after cultures return, blood cultures NGTD


Pulmonary consult


C. difficile sample not obtained and diarrhea stopped quickly


Procal, CK, and CRP significantly elevated


Spinal fluid profile not consistent with bacterial meningitis 








Acute kidney injury


Continue IV fluids


Nephrology consult








Hypophosphatemia


Phos 1.5


replace and follow labs in the AM








Hyperkalemia 2/2 Rhabdomyolisis-improved


K 3.1 12/21


CK >72340, >02607, 6893


Will monitor in AM labs x1


IVF sodium bicarb 12/19








Transaminitis


CT abdomen pelvis performed negative for acute findings, trend CMP


Continues with slow improvement in LFTs overnight








History of diabetes mellitus type 2noncompliant


History of CHFunknown EF


History of hypertension


Patient noncompliant with medication for at least the last few months per wife








DVT PPX: Heparin subcu 


Code status:full


Discharge Plan: Home


Plan to discharge in: Greater than 2 days

## 2024-12-23 LAB
ALBUMIN SERPL BCP-MCNC: 2.7 G/DL (ref 3.4–5)
ALBUMIN/GLOB SERPL: 0.8 {RATIO} (ref 1.1–1.8)
ALP SERPL-CCNC: 36 U/L (ref 45–117)
ALT SERPL W P-5'-P-CCNC: 60 U/L (ref 16–61)
ANION GAP SERPL CALC-SCNC: 7.9 MEQ/L (ref 5–15)
AST SERPL W P-5'-P-CCNC: 193 U/L (ref 15–37)
BUN BLD-MCNC: 14 MG/DL (ref 7–18)
GLOBULIN SER CALC-MCNC: 3.2 G/DL (ref 2.3–3.5)
GLUCOSE SERPLBLD-MCNC: 127 MG/DL (ref 74–106)
HCT VFR BLD CALC: 34.4 % (ref 39.6–49)
HGB BLD-MCNC: 11.3 G/DL (ref 13.6–17.9)
LYMPHOCYTES # SPEC AUTO: 2.1 K/UL (ref 0.7–4.9)
MAGNESIUM SERPL-MCNC: 1.9 MG/DL (ref 1.6–2.4)
MCH RBC QN AUTO: 28.4 PG (ref 27–35)
MCHC RBC AUTO-ENTMCNC: 32.9 G/DL (ref 32–36)
MCV RBC: 86.2 FL (ref 80–100)
NRBC # BLD: 0 10*3/UL (ref 0–0)
NRBC BLD AUTO-RTO: 0.1 % (ref 0–0)
PMV BLD: 9.5 FL (ref 7.6–11.3)
POTASSIUM SERPL-SCNC: 2.9 MEQ/L (ref 3.5–5.1)
RBC # BLD: 3.99 M/UL (ref 4.33–5.43)
WBC # BLD AUTO: 5 THOU/UL (ref 4.3–10.9)

## 2024-12-23 RX ADMIN — POTASSIUM CHLORIDE SCH MLS: 200 INJECTION, SOLUTION INTRAVENOUS at 12:06

## 2024-12-23 RX ADMIN — Medication ONE MLS: at 05:26

## 2024-12-23 RX ADMIN — HEPARIN SODIUM SCH UNIT: 5000 INJECTION, SOLUTION INTRAVENOUS; SUBCUTANEOUS at 07:57

## 2024-12-23 RX ADMIN — SODIUM CHLORIDE, SODIUM LACTATE, POTASSIUM CHLORIDE, AND CALCIUM CHLORIDE SCH MLS: .6; .31; .03; .02 INJECTION, SOLUTION INTRAVENOUS at 15:17

## 2024-12-23 RX ADMIN — POTASSIUM CHLORIDE SCH MLS: 200 INJECTION, SOLUTION INTRAVENOUS at 07:55

## 2024-12-23 RX ADMIN — Medication SCH ML: at 20:46

## 2024-12-23 NOTE — P.PN
Nephrology





(S) Pt remains in the ICU, less lethargic when seen today, off Precedex gtt, 

remains on IVF, polyuric on it. K repleted earlier today. No acute complaints 

when seen





Vitals, medications, blood work and imaging reviewed in the chart





General: In no apparent distress, lethargic


HEENT: Atraumatic, not on O2


Neck: Supple


Respiratory: b/l air entry without sig rhonchi or wheezing


Cardiovascular: No LE edema, Regular rate/rhythm


Gastrointestinal: Non-distended, soft, mild upper quadrant TTP


Musculoskeletal: No contractures, no myalgias


Integumentary: Lethargic, awakens easily, responds briefly, moves UE spont, no 

tremors or myoclonus observed 





Laboratory Data (last 24 hrs)


Reviewed in the EMR








Conclusions/Impression: 





Stage I SHIMA on admission, multifactorial with Cr level normalized


Proteinuria unspecified -noted albuminuria on spot urine testin gbut any 

proteinuria in the setting of current SHIMA may be tubular, inaccurate and needs 

repeating


Microscopic Hematuria, unspecified in the setting of SHIMA, rhabd, other -repeat 

as OP in a few weeks.





-Given persistent CPK levels > 10K, have continued aggressive IVF hydration and 

now polyuric with renal recovery, will check myoglobin level to determine 

continued need for IVF at high rates as myoglobin is the nephrotoxic agent


Check Vit D as there are reports of deficiency being associated with rhabdo and 

total Ca is low





Hyperkalemia in the setting of above and rhabdo, resolved and now with 

hypokalemia


-K repleted earlier, will switch maintenance IVF to LR





Acute rhabdomyolysis 


-In the setting of illness +/- other, no UDS performed on admission. CPK level 

remains > 10K, urine pH was low, switched to bicarb added IVF to alkalanize 

urine Fri but mansoor now d/c, check myoglobin level as menntioned above





Influenza A illness, PNA


-Resp status stable, management per IM

## 2024-12-23 NOTE — P.PN
Date of Service: 12/23/24





Subjective:


More awake/alert. Mentation improving. oriented x3 - know he's in hospital, 

doesn't recall why or last few days


1 episode of diarrhea overnight 


breathing more comfortably on room air. 


afebrile 





ROS: 


10 point ROS as noted above, otherwise negative





Physical Exam:


GEN: Alert, orientedx3, NAD, somnolent


CV: Regular rate and rhythm, trace lower extremity edema bilaterally


Pulm: Nonlabored respirations on room air, mildy diminished bilaterally 


ABD: soft, nontender, nondistended 


Mcmullen in place 





Problem List:


Severe sepsis secondary to acute influenza A+


Metabolic encephalopathy secondary to above


Acute Rhabdomyolysis


SHIMA, resolved 


Hyperkalemia secondary to rhabdo


Hypophosphatemia


Transaminitis


NIDDM2


chronic CHFunknown EF


Hypertension





Plan:


Severe sepsis secondary to acute influenza A+


Metabolic encephalopathy secondary to above 


on admission, presents with altered mentation, confusion, lethargy, shortness of

breath.


tested positive for influenza A 12/17 


CXR (12/17): mild interstitial prominence L > R, likely related to infection.


CT head (12/17) negative; CT abd (12/17) noted fatty liver otherwise negative. 


continue empiric IV cefepime / levaquin for now (12/21); added as a precaution 

to cover possible pneumonia/bacterial infection 


   Blood cx (12/17): no growth. 


   Stool studies pending 


   s/p lumbar puncture (12/17): culture without growth


   dc abx 12/23 - do not suspect bacterial infxn, and now s/p 7 day treatment


off precedex since 12/19 


Dr. Santacruz, pulm is following - out of town


soft/bite sized diet 


PT consult 


procal resolved, CRP significantly improved 12/23 


More awake/alert. Mentation improving





Acute Rhabdomyolysis


SHIMA, resolved 


Hyperkalemia secondary to rhabdo


Hypophosphatemia


Monitor and replete electrolytes as needed


Continue to monitor renal function 


Nephrology consulted


Continue IVF, will discuss with nephro regarding bicarb


creatinine improved 


CPK 7k -> 12k+ (12/23) 





Transaminitis


CT abdomen pelvis performed negative for acute findings


Daily labs - improving/stable 





NIDDM2


chronic CHFunknown EF


Hypertension


Patient noncompliant with medication for at least the last few months per wife


confirm home meds, restart as appropriate 





VTE: heparin sq 


Code: Full


Dispo: Home, ~2-3 days


pending rhadbo resolves, off oxygen, mentation improving, eating


anticipate downgrade in next ~24hrs, may need sitter as still confused





Time Spent Managing Pts Care (In Minutes): 55

## 2024-12-24 LAB
ALBUMIN SERPL BCP-MCNC: 2.7 G/DL (ref 3.4–5)
ALBUMIN/GLOB SERPL: 0.9 {RATIO} (ref 1.1–1.8)
ALP SERPL-CCNC: 35 U/L (ref 45–117)
ALT SERPL W P-5'-P-CCNC: 72 U/L (ref 16–61)
ANION GAP SERPL CALC-SCNC: 5.8 MEQ/L (ref 5–15)
AST SERPL W P-5'-P-CCNC: 369 U/L (ref 15–37)
BUN BLD-MCNC: 9 MG/DL (ref 7–18)
GLOBULIN SER CALC-MCNC: 3 G/DL (ref 2.3–3.5)
GLUCOSE SERPLBLD-MCNC: 113 MG/DL (ref 74–106)
HCT VFR BLD CALC: 32.6 % (ref 39.6–49)
HGB BLD-MCNC: 11 G/DL (ref 13.6–17.9)
LYMPHOCYTES # SPEC AUTO: 2.1 K/UL (ref 0.7–4.9)
MAGNESIUM SERPL-MCNC: 1.8 MG/DL (ref 1.6–2.4)
MCH RBC QN AUTO: 28.6 PG (ref 27–35)
MCHC RBC AUTO-ENTMCNC: 33.7 G/DL (ref 32–36)
MCV RBC: 84.8 FL (ref 80–100)
NRBC # BLD: 0 10*3/UL (ref 0–0)
NRBC BLD AUTO-RTO: 0.3 % (ref 0–0)
PMV BLD: 10.2 FL (ref 7.6–11.3)
POTASSIUM SERPL-SCNC: 2.8 MEQ/L (ref 3.5–5.1)
RBC # BLD: 3.85 M/UL (ref 4.33–5.43)
WBC # BLD AUTO: 5 THOU/UL (ref 4.3–10.9)

## 2024-12-24 RX ADMIN — POTASSIUM & SODIUM PHOSPHATES POWDER PACK 280-160-250 MG SCH PKT: 280-160-250 PACK at 09:24

## 2024-12-24 RX ADMIN — CHOLECALCIFEROL CAP 125 MCG (5000 UNIT) SCH UNIT: 125 CAP at 13:32

## 2024-12-24 RX ADMIN — POTASSIUM CHLORIDE SCH MLS: 200 INJECTION, SOLUTION INTRAVENOUS at 09:28

## 2024-12-24 RX ADMIN — HEPARIN SODIUM SCH UNIT: 5000 INJECTION, SOLUTION INTRAVENOUS; SUBCUTANEOUS at 08:03

## 2024-12-24 RX ADMIN — POTASSIUM CHLORIDE ONE MEQ: 10 TABLET, FILM COATED, EXTENDED RELEASE ORAL at 22:54

## 2024-12-24 RX ADMIN — MAGNESIUM SULFATE IN DEXTROSE ONE MLS: 10 INJECTION, SOLUTION INTRAVENOUS at 09:25

## 2024-12-24 NOTE — P.PN
Nephrology





(S) Pt remains in the ICU, less lethargic when seen today, off Precedex gtt, 

remains on IVF, polyuric on it. K repleted earlier today. No acute complaints 

when seen





Vitals, medications, blood work and imaging reviewed in the chart





General: In no apparent distress, lethargic


HEENT: Atraumatic, not on O2


Neck: Supple


Respiratory: b/l air entry without sig rhonchi or wheezing


Cardiovascular: No LE edema, Regular rate/rhythm


Gastrointestinal: Non-distended, soft, mild upper quadrant TTP


Musculoskeletal: No contractures, no myalgias


Integumentary: Lethargic, awakens easily, responds briefly, moves UE spont, no 

tremors or myoclonus observed 





Laboratory Data (last 24 hrs)


Reviewed in the EMR








Conclusions/Impression: 





Stage I SHIMA on admission, multifactorial with Cr level normalized


Proteinuria unspecified -noted albuminuria on spot urine testing but any 

proteinuria in the setting of current SHIMA may be tubular, inaccurate and needs 

repeating


Microscopic Hematuria, unspecified in the setting of SHIMA, rhabdo, other -repeat 

as OP in a few weeks.





-Given persistent CPK levels > 10K, have continued aggressive IVF hydration and 

now polyuric with renal recovery, did order myoglobin level to determine 

continued need for IVF at high rates as myoglobin is the nephrotoxic agent but 

must be send out test as no levels reported


Checked Vit D as there are reports of deficiency being associated with rhabdo 

and total Ca is low and level severely reduced and < 10, placed on Vit D3 5000 

IU daily





Hyperkalemia in the setting of above and rhabdo, resolved and now with 

hypokalemia


-Receiving additional IV KCL today, recheck post repletion, did switch 

maintenance IVF to LR





Acute rhabdomyolysis 


-In the setting of illness +/- other, no UDS performed on admission. CPK level 

remains > 10K (now actual level reported), urine pH last week was low, switched 

to bicarb added IVF to alkalanize urine Fri but did since d/c, check myoglobin 

level as menntioned above





Influenza A illness, PNA


-Resp status stable, management per IM

## 2024-12-24 NOTE — P.PN
Subjective


Date of Service: 12/24/24


Chief Complaint: AMS, pneumonia


Patient reports feeling better.  He endorsed fatigue.  Patient was able to 

ambulate yesterday.


He requested Mcmullen catheter to be removed.








Physical Examination





- Vital Signs


Temperature: 97.5 F


Blood Pressure: 138/74


Pulse: 60


Respirations: 20


Pulse Ox (%): 98





Assessment And Plan





- Plan


Physical Exam:


GEN: Alert, orientedx3, NAD.


CV: Normal rate and regular rhythm, no murmur


Pulm: Clear to auscultation bilaterally, adequate breath sounds bilaterally.


ABD: soft, nontender, nondistended.


Extremities: Trace bilateral lower extremity pitting edema


Urogenital: Mcmullen in place 





Problem List:


Severe sepsis secondary to acute influenza A+


Metabolic encephalopathy secondary to above


Acute Rhabdomyolysis


SHIMA, resolved 


Hyperkalemia secondary to rhabdo


Hypophosphatemia


Transaminitis


NIDDM2


chronic CHFunknown EF


Hypertension





Plan:


Severe sepsis secondary to acute influenza A+


Metabolic encephalopathy secondary to above 


Patient tested positive for influenza A 12/17 


CXR (12/17): mild interstitial prominence L > R, likely related to infection.


CT head (12/17) negative; CT abd (12/17) noted fatty liver otherwise negative. 


continue empiric IV cefepime / levaquin for now (12/21) for possible secondary 

bacterial pneumonia.


   Blood cx (12/17): no growth. 


   Stool studies pending 


   s/p lumbar puncture (12/17): culture without growth


   Patient completed 7 days of antibiotics.  Antibiotics discontinued.


off precedex since 12/19 and currently awake and alert.


Dr. Santacruz pulmonary evaluated and assisted with management.


He is tolerating soft/bite sized diet 


Continue PT


More awake/alert. Mentation improving





Acute Rhabdomyolysis


SHIMA, resolved 


Hyperkalemia secondary to rhabdo


Hypophosphatemia


Acute rhabdomyolysis likely secondary to severe influenza infection.


Nephrology is following


Continue IV fluid.


CK levels trended up significantly over the last couple of days.


Monitor CK levels


Monitor and replete electrolytes as needed


Continue to monitor renal function 


Sodium bicarb infusion per nephrology.


Discontinue Mcmullen catheter.





Transaminitis


CT abdomen pelvis performed negative for acute findings


Elevated LFTs likely related to elevated CK.


LFTs trended down.


Monitor levels.





NIDDM2


chronic CHFunknown EF


Hypertension


Patient noncompliant with medication for at least the last few months per wife


Validate home medications and resume them as needed.


Insulin sliding scale for glucose management.





VTE: heparin sq 


Code: Full


Dispo: Home, ~2-3 days





Time Spent Managing Pts Care (In Minutes): 47 minutes

## 2024-12-25 LAB
ANION GAP SERPL CALC-SCNC: 7 MEQ/L (ref 5–15)
BUN BLD-MCNC: 8 MG/DL (ref 7–18)
GLUCOSE SERPLBLD-MCNC: 94 MG/DL (ref 74–106)
MAGNESIUM SERPL-MCNC: 1.7 MG/DL (ref 1.6–2.4)
POTASSIUM SERPL-SCNC: 3 MEQ/L (ref 3.5–5.1)

## 2024-12-25 RX ADMIN — POTASSIUM & SODIUM PHOSPHATES POWDER PACK 280-160-250 MG SCH PKT: 280-160-250 PACK at 08:53

## 2024-12-25 RX ADMIN — MAGNESIUM SULFATE IN DEXTROSE ONE MLS: 10 INJECTION, SOLUTION INTRAVENOUS at 08:53

## 2024-12-25 RX ADMIN — SODIUM CHLORIDE, SODIUM LACTATE, POTASSIUM CHLORIDE, AND CALCIUM CHLORIDE SCH MLS: .6; .31; .03; .02 INJECTION, SOLUTION INTRAVENOUS at 11:52

## 2024-12-25 RX ADMIN — LOSARTAN POTASSIUM SCH MG: 50 TABLET, FILM COATED ORAL at 14:33

## 2024-12-25 RX ADMIN — POTASSIUM CHLORIDE SCH MLS: 200 INJECTION, SOLUTION INTRAVENOUS at 11:52

## 2024-12-25 NOTE — P.PN
Nephrology





(S) Pt out of the ICU,  mentation has improved, denies any myalgias, cramps, 

spasms or other new symptoms





Vitals, medications, blood work and imaging reviewed in the chart





General: In no apparent distress, lethargic


HEENT: Atraumatic, not on O2


Neck: Supple


Respiratory: b/l air entry without sig rhonchi or wheezing


Cardiovascular: No LE edema, Regular rate/rhythm


Gastrointestinal: Non-distended, soft, NT


Musculoskeletal: No contractures, no myalgias


Integumentary: Awake, conversive, no tremors or myoclonus observed 





Laboratory Data (last 24 hrs)


Reviewed in the EMR








Conclusions/Impression: 





Stage I SHIMA on admission, multifactorial with Cr level normalized


Proteinuria unspecified -noted albuminuria on spot urine testing but any 

proteinuria in the setting of current SHIMA may be tubular, inaccurate and needs 

repeating


Microscopic Hematuria, unspecified in the setting of SHIMA, rhabdo, other -repeat 

as OP in a few weeks.





-Given persistent CPK levels > 10K, had continued aggressive IVF hydration and 

then polyuric with renal recovery, did order myoglobin level to determine 

continued need for IVF at high rates as myoglobin is the nephrotoxic agent but 

must be send out test as no levels reported


Checked Vit D as there are reports of deficiency being associated with rhabdo 

and total Ca is low and level severely reduced and < 10, placed on Vit D3 5000 

IU daily


CPK level a few days was reported as < 10K and then since then has again risen 

dramatically which is unexpected, while pt has been relatively bed bound over 

the past few days, unclear if other medications received in recent days 

(precedex, other) to blame, will discuss with IM





Hyperkalemia in the setting of above and rhabdo, resolved and now with 

hypokalemia


-Receiving additional IV KCL today, did switch maintenance IVF to LR





Acute rhabdomyolysis 


-In the setting of illness, Vit D deficiency +/- other, no UDS performed on 

admission. CPK level a few days was reported as < 10K and then since then has 

again risen dramatically which is unexpected, while pt has been relatively bed 

bound over the past few days, unclear if other medications received in recent 

days (precedex, other) to blame, will discuss with IM





Chronic HTN


-BP has trended up mod, will avoid CCB given potential for drug induced 

myopathy, will use ARB agent with his lower K levels

## 2024-12-26 LAB
ALBUMIN SERPL BCP-MCNC: 2.7 G/DL (ref 3.4–5)
ANION GAP SERPL CALC-SCNC: 4.8 MEQ/L (ref 5–15)
BUN BLD-MCNC: 7 MG/DL (ref 7–18)
GLUCOSE SERPLBLD-MCNC: 134 MG/DL (ref 74–106)
MAGNESIUM SERPL-MCNC: 1.7 MG/DL (ref 1.6–2.4)
POTASSIUM SERPL-SCNC: 2.8 MEQ/L (ref 3.5–5.1)

## 2024-12-26 RX ADMIN — MAGNESIUM OXIDE TAB 400 MG (241.3 MG ELEMENTAL MG) SCH MG: 400 (241.3 MG) TAB at 13:08

## 2024-12-26 RX ADMIN — POTASSIUM CHLORIDE SCH MLS: 200 INJECTION, SOLUTION INTRAVENOUS at 08:22

## 2024-12-26 RX ADMIN — POTASSIUM CHLORIDE SCH MEQ: 10 TABLET, FILM COATED, EXTENDED RELEASE ORAL at 17:40

## 2024-12-26 RX ADMIN — MAGNESIUM SULFATE IN DEXTROSE ONE MLS: 10 INJECTION, SOLUTION INTRAVENOUS at 08:23

## 2024-12-26 RX ADMIN — AMLODIPINE BESYLATE SCH MG: 10 TABLET ORAL at 13:08

## 2024-12-26 NOTE — P.PN
Subjective


Date of Service: 12/26/24


Chief Complaint: AMS, pneumonia


Patient reports has been ambulating without difficulty.


He currently denies any complaint














Physical Examination





- Vital Signs


Temperature: 98.1 F


Blood Pressure: 167/94


Pulse: 72


Respirations: 12


Pulse Ox (%): 92





Assessment And Plan





- Plan


Physical Exam:


GEN: Alert, orientedx3, NAD.


CV: Normal rate and regular rhythm, no murmur


Pulm: Clear to auscultation bilaterally, adequate breath sounds bilaterally.


ABD: soft, nontender, nondistended.


Extremities: Trace bilateral lower extremity pitting edema








Diagnosis


Severe sepsis secondary to acute influenza A+


Metabolic encephalopathy secondary to above


Acute Rhabdomyolysis


SHIMA, resolved 


Hyperkalemia secondary to rhabdo


Hypophosphatemia


Transaminitis


NIDDM2


chronic CHFunknown EF


Hypertension





Plan:


Severe sepsis secondary to acute influenza A+


Metabolic encephalopathy secondary to above 


Patient tested positive for influenza A 12/17 


CXR (12/17): mild interstitial prominence L > R, likely related to infection.


CT head (12/17) negative; CT abd (12/17) noted fatty liver otherwise negative. 


Patient was on empiric IV cefepime / levaquin for possible secondary bacterial 

pneumonia.


Blood cx (12/17): no growth. 


s/p lumbar puncture (12/17): culture without growth


Sepsis resolved.


Patient completed 7 days of antibiotics.  Antibiotics discontinued.


off precedex since 12/19.


AMS resolved


Dr. Santacruz pulmonary evaluated and assisted with management.


He is tolerating diet.


Continue PT








Acute Rhabdomyolysis


SHIMA, resolved 


Hyperkalemia secondary to rhabdo


Hypophosphatemia


Acute rhabdomyolysis likely secondary to severe influenza infection.


Nephrology is following.


CK level started trending down again.


Continue IV fluid.


Monitor CK daily.


Serial myoglobin levels are pending


SHIMA resolved and renal function has been stable


Continue to monitor renal function 





Transaminitis


CT abdomen pelvis performed negative for acute findings


Elevated LFTs likely related to elevated CK.


Monitor levels.





NIDDM2


chronic CHFunknown EF


Hypertension


Patient noncompliant with medication for at least the last few months per wife


Insulin sliding scale.


Continue losartan and amlodipine for hypertension





Hypokalemia


Nephrology is following.


Replace potassium.





VTE: heparin sq 


Code: Full


Dispo: Home.

## 2024-12-26 NOTE — P.PN
Nephrology





(S) Pt out of the ICU,  mentation has improved, denies any myalgias, cramps, 

spasms or other new symptoms. Starting to eat more he reports. POC discussed





Vitals, medications, blood work and imaging reviewed in the chart





General: In no apparent distress, lethargic


HEENT: Atraumatic, not on O2


Neck: Supple


Respiratory: b/l air entry without sig rhonchi or wheezing


Cardiovascular: No LE edema, Regular rate/rhythm


Gastrointestinal: Non-distended, soft, NT


Musculoskeletal: No contractures, no myalgias


Integumentary: Awake, conversive, no tremors or myoclonus observed 





Laboratory Data (last 24 hrs)


Reviewed in the EMR








Conclusions/Impression: 





Stage I SHIMA on admission, multifactorial resolved with Cr level normalized


Proteinuria unspecified -noted albuminuria on spot urine testing but any 

proteinuria in the setting of current SHIMA may be tubular, inaccurate and needs 

repeating


Microscopic Hematuria, unspecified in the setting of SHIMA, rhabdo, other -repeat 

as OP in a few weeks.





Persistent hypokalemia


-Will check TTKG to assess for renal wasting in the setting of post SHIMA 

polyuria, other





-Given persistent CPK levels > 10K, had continued aggressive IVF hydration and 

then polyuric with renal recovery, did order myoglobin level to determine 

continued need for IVF at high rates as myoglobin is the nephrotoxic agent but 

must be send out test as no levels reported


Checked Vit D as there are reports of deficiency being associated with rhabdo 

and total Ca is low and level severely reduced and < 10, placed on Vit D3 5000 

IU daily


TSH ok





CPK level a few days was reported as < 10K and then since then has again risen 

dramatically which is unexpected, while pt has been relatively bed bound over 

the past few days, unclear if other medications received in recent days 

(precedex, other) to blame, will discuss with IM





Hyperkalemia in the setting of above and rhabdo, resolved and now with 

hypokalemia


-Receiving additional IV KCL today, did switch maintenance IVF to LR. Ordered 

scheduled PO KCL and Mg. Target Mg > 2.0





Acute rhabdomyolysis 


-In the setting of illness, Vit D deficiency +/- other, no UDS performed on 

admission. CPK level a few days was reported as < 10K and then since then has 

again risen dramatically which is unexpected, while pt has been relatively bed 

bound over the past few days, unclear if other medications received in recent 

days (precedex, other) to blame, will discuss with IM





Chronic HTN


-BP has trended up mod, will avoid CCB given potential for drug induced myopath

y, will use ARB agent with his lower K levels

## 2024-12-27 VITALS — OXYGEN SATURATION: 98 %

## 2024-12-27 LAB
ALBUMIN SERPL BCP-MCNC: 3.2 G/DL (ref 3.4–5)
ANION GAP SERPL CALC-SCNC: 6.3 MEQ/L (ref 5–15)
BUN BLD-MCNC: 6 MG/DL (ref 7–18)
CRP SERPL-MCNC: 6.41 MG/L (ref ?–3)
GLUCOSE SERPLBLD-MCNC: 107 MG/DL (ref 74–106)
MAGNESIUM SERPL-MCNC: 1.9 MG/DL (ref 1.6–2.4)
POTASSIUM SERPL-SCNC: 3.3 MEQ/L (ref 3.5–5.1)

## 2024-12-27 RX ADMIN — HUMAN INSULIN SCH: 100 INJECTION, SOLUTION SUBCUTANEOUS at 07:30

## 2024-12-27 RX ADMIN — DEXTROSE AND SODIUM CHLORIDE SCH MLS: 5; .9 INJECTION, SOLUTION INTRAVENOUS at 12:22

## 2024-12-27 RX ADMIN — Medication SCH MG: at 09:02

## 2024-12-27 NOTE — P.PN
Nephrology





(S) Pt out of the ICU,  mentation has improved, denies any myalgias, cramps, 

spasms or other new symptoms. Starting to eat more he reports. POC discussed





Vitals, medications, blood work and imaging reviewed in the chart





General: In no apparent distress, lethargic


HEENT: Atraumatic, not on O2


Neck: Supple


Respiratory: b/l air entry without sig rhonchi or wheezing


Cardiovascular: No LE edema, Regular rate/rhythm


Gastrointestinal: Non-distended, soft, NT


Musculoskeletal: No contractures, no myalgias


Integumentary: Awake, conversive, no tremors or myoclonus observed 





Laboratory Data (last 24 hrs)


Reviewed in the EMR








Conclusions/Impression: 





Stage I SHIMA on admission, multifactorial resolved with Cr level normalized


Proteinuria unspecified -noted albuminuria on spot urine testing but any 

proteinuria in the setting of current SHIMA may be tubular, inaccurate and needs 

repeating


Microscopic Hematuria, unspecified in the setting of SHIMA, rhabdo, other -repeat 

as OP in a few weeks.





Persistent hypokalemia


-Will check TTKG to assess for renal wasting in the setting of post SHIMA 

polyuria, other. Random urine K < 10 argues against renal wasting and K level 

closer to LLN this AM, cont scheduled KCL





-Given persistent CPK levels > 10K, had continued aggressive IVF hydration and 

then polyuric with renal recovery, did order myoglobin level to determine 

continued need for IVF at high rates as myoglobin is the nephrotoxic agent but 

must be send out test as no levels reported


Checked Vit D as there are reports of deficiency being associated with rhabdo 

and total Ca is low and level severely reduced and < 10, placed on Vit D3 5000 

IU daily


TSH ok





CPK level a few days was reported as < 10K and then since then has again risen 

dramatically which is unexpected, while pt has been relatively bed bound over 

the past few days, unclear if other medications received in recent days 

(precedex, other) to blame, will discuss with IM








Hyperkalemia in the setting of above and rhabdo, resolved and now with 

hypokalemia


-Receiving additional IV KCL today, did switch maintenance IVF to LR. Ordered 

scheduled PO KCL and Mg. Target Mg > 2.0





Acute rhabdomyolysis 


-In the setting of illness, Vit D deficiency +/- other, no UDS performed on 

admission. CPK level a few days was reported as < 10K and then since then has 

again risen dramatically which is unexpected, while pt has been relatively bed 

bound over the past few days, unclear if other medications received in recent 

days (precedex, other) to blame, will discuss with IM





Underlying metabolic myopathy unlikely as no prior reports but will order 

additional testing including considering for polymyositis





Chronic HTN


-BP has trended up mod, will avoid CCB given potential for drug induced 

myopathy, will use ARB agent with his lower K levels

## 2024-12-27 NOTE — P.PN
Subjective


Date of Service: 12/27/24


Chief Complaint: AMS, pneumonia


Patient reports generalized weakness, otherwise he has no new complain.














Physical Examination





- Vital Signs


Temperature: 98 F


Blood Pressure: 147/75


Pulse: 68


Respirations: 15


Pulse Ox (%): 98





Assessment And Plan





- Plan


Physical Exam:


GEN: Alert, orientedx3, NAD.


CV: Normal rate and regular rhythm, no murmur


Pulm: Clear to auscultation bilaterally, adequate breath sounds bilaterally.


ABD: soft, nontender, nondistended.


Extremities: Trace bilateral lower extremity pitting edema








Diagnosis


Severe sepsis secondary to acute influenza A+


Metabolic encephalopathy secondary to above


Acute Rhabdomyolysis


SHIMA, resolved 


Hyperkalemia secondary to rhabdo


Hypophosphatemia


Transaminitis


NIDDM2


chronic CHFunknown EF


Hypertension





Plan:


Severe sepsis secondary to acute influenza A+


Metabolic encephalopathy secondary to above 


Patient tested positive for influenza A 12/17 


CXR (12/17): mild interstitial prominence L > R, likely related to infection.


CT head (12/17) negative; CT abd (12/17) noted fatty liver otherwise negative. 


Patient was on empiric IV cefepime / levaquin for possible secondary bacterial 

pneumonia.


Blood cx (12/17): no growth. 


s/p lumbar puncture (12/17): culture without growth


Sepsis resolved.


Patient completed 7 days of antibiotics.  Antibiotics discontinued.


off precedex since 12/19.


AMS resolved


Dr. Santacruz pulmonary evaluated and assisted with management.


Patient is now ambulating independently.








Acute Rhabdomyolysis


SHIMA, resolved 


Hyperkalemia secondary to rhabdo


Hypophosphatemia


Acute rhabdomyolysis likely secondary to severe influenza infection.


Nephrology is following.


CK level has been fluctuating and still significantly elevated.


Myoglobin also significantly elevated and levels correlate with CK levels.


Concern for underlying myopathy or myositis versus immobility or drug-induced 

rhabdomyolysis.


Hepatitis viral panel ordered, HIV ordered, including aldolase and autoimmune 

screening.


Continue IV fluid.


Monitor CK daily.


SHIMA resolved and renal function has been stable despite wide fluctuations in CK 

levels.


Patient is requesting to go home


Nephrology is considering outpatient follow-up with renal function and CK 

levels.


Continue to monitor renal function. 





Transaminitis


CT abdomen pelvis performed negative for acute findings


Elevated LFTs likely related to elevated CK.


Monitor levels.





NIDDM2


chronic CHFunknown EF


Hypertension


Patient noncompliant with medication for at least the last few months per wife


Insulin sliding scale.


Continue losartan and amlodipine for hypertension





Hypokalemia


Nephrology is following.


Replace potassium as needed.





VTE: heparin sq 


Code: Full


Dispo: Home.

## 2024-12-28 VITALS — DIASTOLIC BLOOD PRESSURE: 83 MMHG | SYSTOLIC BLOOD PRESSURE: 143 MMHG

## 2024-12-28 VITALS — TEMPERATURE: 98 F

## 2024-12-28 LAB
ALBUMIN SERPL BCP-MCNC: 3.2 G/DL (ref 3.4–5)
ANION GAP SERPL CALC-SCNC: 7.6 MEQ/L (ref 5–15)
BUN BLD-MCNC: 7 MG/DL (ref 7–18)
GLUCOSE SERPLBLD-MCNC: 102 MG/DL (ref 74–106)
POTASSIUM SERPL-SCNC: 3.6 MEQ/L (ref 3.5–5.1)

## 2024-12-28 RX ADMIN — POTASSIUM CHLORIDE ONE: 10 TABLET, FILM COATED, EXTENDED RELEASE ORAL at 08:28

## 2024-12-28 NOTE — P.DS
Admission Date: 12/17/24


Discharge Date: 12/28/24


Disposition: ROUTINE DISCHARGE


Discharge Condition: FAIR


Reason for Admission: AMS, pneumonia


Brief History of Present Illness: 


39-year-old male with history of diabetes, hypertension, presented to emergency 

department chief complaint of altered mental status.  His wife reported that 

over the course of the last 24 to 36 hours he had profuse diarrhea, vomiting and

and then became confused.  He was incontinent of urine and stool.  





When he arrived to the emergency department he was lethargic, confused, oriented

x 1-2.  His labs were significant for a white blood cell count of 10.1 platelets

113 sodium 135 creatinine 1.75 GFR 50 glucose 179 lactic acid 3.4  ALT 

152 T. bili 0.8.  CT of the abdomen pelvis was performed which revealed no acute

or concerning abnormalities in the abdomen or pelvis, does show fatty liver.  CT

of the head was negative for acute findings, chest x-ray showed mild 

interstitial prominence greatest in the left upper lobe likely related to infect

ion.





Given patient's significant altered mental status and lack of clear explanation 

lumbar puncture was also performed, spinal fluid was clear.  Patient was 

hospitalized for further management.  





Hospital Course: 


Diagnosis


Severe sepsis secondary to acute influenza A+


Metabolic encephalopathy secondary to above


Acute Rhabdomyolysis


SHIMA, resolved 


Hyperkalemia secondary to rhabdo


Hypophosphatemia


Transaminitis


NIDDM2


chronic CHFunknown EF


Hypertension





Plan:


Severe sepsis secondary to acute influenza A+


Metabolic encephalopathy secondary to above 


Patient tested positive for influenza A 12/17 


CXR (12/17): mild interstitial prominence L > R, likely related to infection.


CT head (12/17) negative; CT abd (12/17) noted fatty liver otherwise negative. 


Patient was on empiric IV cefepime / levaquin for possible secondary bacterial 

pneumonia.


Blood cx (12/17): no growth. 


s/p lumbar puncture (12/17): culture without growth


Sepsis resolved.


Patient completed 7 days of antibiotics.  Antibiotics discontinued.


He was briefly on precedex for agitation which was later discontinued


AMS resolved


Dr. Santacruz pulmonary evaluated and assisted with management.


Patient is ambulating independently.








Acute Rhabdomyolysis


SHIMA, resolved 


Hyperkalemia secondary to rhabdo


Hypophosphatemia


Acute rhabdomyolysis likely secondary to severe influenza infection.  Underlying

myopathy/myositis not ruled out


Nephrology evaluated patient and assisted with management.


CK level was significantly elevated, [place] [address], levels Fluctuating, and 

currently down to 9000.


Myoglobin also significantly elevated and levels correlated with CK levels.


Concern for underlying myopathy or myositis versus immobility or drug-induced 

rhabdomyolysis.


Hepatitis viral panel ordered, patient tested positive for hepatitis B surface 

antigen indicating chronic carrier/infection.


HIV was nonreactive, aldolase and autoimmune screening are pending to be 

followed by follow-up by nephrology as outpatient.


Patient treated aggressively with IV fluid


SHIMA resolved and renal function has been stable despite wide fluctuations in CK 

levels.


Nephrology will follow-up with patient in the office next week for repeat CK 

level check.





Transaminitis


CT abdomen pelvis performed negative for acute findings


Elevated LFTs likely related to elevated CK.


Patient also tested positive for hepatitis B surface antigen.


He is informed to follow-up with GI Dr. Barajas as outpatient to evaluate for 

possible treatment.


Monitor levels.





NIDDM2


chronic CHFunknown EF


Hypertension


Patient noncompliant with medication for at least the last few months per wife


Patient was placed on insulin sliding scale by he barely needed insulin 

coverage.


Continued losartan and amlodipine for hypertension during the hospital stay.


Nephrology recommended losartan only for BP control for now given the 

possibility of drug-induced rhabdomyolysis and avoiding amlodipine which is 

calcium channel blocker for now.





Hypokalemia


Nephrology is following.


Potassium was replaced.











Vital Signs/Physical Exam: 














Temp Pulse Resp BP Pulse Ox


 


 97.9 F   66   18   143/83 H  96 


 


 12/28/24 04:00  12/28/24 08:28  12/28/24 04:00  12/28/24 08:28  12/28/24 04:00








General: Alert, In no apparent distress, Oriented x3


HEENT: Mucous membr. moist/pink, Sclerae nonicteric


Neck: Supple, JVD not distended


Respiratory: Clear to auscultation bilaterally, Normal air movement


Cardiovascular: No edema, Regular rate/rhythm, Normal S1 S2


Gastrointestinal: Normal bowel sounds, Soft and benign, Non-distended, No 

tenderness


Musculoskeletal: No swelling, No tenderness


Integumentary: No rashes, No cyanosis


Neurological: Normal speech, Normal strength at 5/5 x4 extr


Laboratory Data at Discharge: 














WBC  5.00 thou/uL (4.3-10.9)   12/24/24  04:44    


 


Hgb  11.0 g/dL (13.6-17.9)  L  12/24/24  04:44    


 


Hct  32.6 % (39.6-49.0)  L  12/24/24  04:44    


 


Plt Count  107 thou/uL (152-406)  L D 12/24/24  04:44    


 


PT  12.6 SECONDS (9.4-12.5)  H  12/19/24  06:20    


 


INR  1.13   12/19/24  06:20    


 


APTT  34.6 SECONDS (24.3-36.9)   12/19/24  06:20    


 


Sodium  142 mEq/L (136-145)   12/28/24  05:50    


 


Potassium  3.6 mEq/L (3.5-5.1)   12/28/24  05:50    


 


BUN  7 mg/dL (7-18)   12/28/24  05:50    


 


Creatinine  0.84 mg/dL (0.70-1.30)   12/28/24  05:50    


 


Glucose  102 mg/dL ()   12/28/24  05:50    


 


Uric Acid  8.3 mg/dL (3.5-7.2)  H  12/19/24  06:20    


 


Phosphorus  4.3 mg/dL (2.5-4.9)   12/28/24  05:50    


 


Magnesium  1.9 mg/dL (1.6-2.4)   12/27/24  05:03    


 


Total Bilirubin  0.5 mg/dL (0.2-1.0)   12/24/24  04:44    


 


AST  369 U/L (15-37)  H  12/24/24  04:44    


 


ALT  72 U/L (16-61)  H  12/24/24  04:44    


 


Alkaline Phosphatase  35 U/L ()  L  12/24/24  04:44    








Home Medications: 








NK [No Home Meds]  12/17/24 


Cholecalciferol (Vitamin D3) [Vitamin D 5,000 IU Cap*] 5,000 unit PO DAILY #30 

cap 12/28/24 


Losartan Potassium [Cozaar*] 50 mg PO BID #60 tab 12/28/24 


Thiamine HCl [Vitamin B-1*] 100 mg PO DAILY #30 tab 12/28/24 





New Medications: 


Losartan Potassium [Cozaar*] 50 mg PO BID #60 tab


Thiamine HCl [Vitamin B-1*] 100 mg PO DAILY #30 tab


Cholecalciferol (Vitamin D3) [Vitamin D 5,000 IU Cap*] 5,000 unit PO DAILY #30 

cap


Diet: ADA


Activity: Ad pranav


Followup: 


NONE,NONE [Primary Care Provider] - 


Ashvin Crandall [ACTIVE - CAN ADMIT] - 1 Week (For repeat labs and BP check)


Lennox Barajas MD [ACTIVE - CAN ADMIT] - 1-2 Weeks (For evaluation for 

hepatitis B treatment)


Time spent managing pt's care (in minutes): 38